# Patient Record
Sex: FEMALE | Race: WHITE | NOT HISPANIC OR LATINO | Employment: FULL TIME | URBAN - METROPOLITAN AREA
[De-identification: names, ages, dates, MRNs, and addresses within clinical notes are randomized per-mention and may not be internally consistent; named-entity substitution may affect disease eponyms.]

---

## 2017-09-21 ENCOUNTER — TRANSCRIBE ORDERS (OUTPATIENT)
Dept: LAB | Facility: HOSPITAL | Age: 22
End: 2017-09-21

## 2017-09-21 ENCOUNTER — LAB (OUTPATIENT)
Dept: LAB | Facility: HOSPITAL | Age: 22
End: 2017-09-21

## 2017-09-21 DIAGNOSIS — Z34.81 PRENATAL CARE, SUBSEQUENT PREGNANCY, FIRST TRIMESTER: Primary | ICD-10-CM

## 2017-09-21 DIAGNOSIS — Z34.81 PRENATAL CARE, SUBSEQUENT PREGNANCY, FIRST TRIMESTER: ICD-10-CM

## 2017-09-21 LAB
ABO GROUP BLD: NORMAL
AMPHET+METHAMPHET UR QL: NEGATIVE
AMPHETAMINES UR QL: NEGATIVE
BACTERIA UR QL AUTO: NORMAL /HPF
BARBITURATES UR QL SCN: NEGATIVE
BASOPHILS # BLD AUTO: 0.03 10*3/MM3 (ref 0–0.2)
BASOPHILS NFR BLD AUTO: 0.3 % (ref 0–1)
BENZODIAZ UR QL SCN: NEGATIVE
BILIRUB UR QL STRIP: NEGATIVE
BILIRUB UR QL STRIP: NEGATIVE
BLD GP AB SCN SERPL QL: NEGATIVE
BUPRENORPHINE SERPL-MCNC: NEGATIVE NG/ML
CANNABINOIDS SERPL QL: NEGATIVE
CLARITY UR: CLEAR
CLARITY UR: CLEAR
COCAINE UR QL: NEGATIVE
COLOR UR: YELLOW
COLOR UR: YELLOW
DEPRECATED RDW RBC AUTO: 40.6 FL (ref 37–54)
EOSINOPHIL # BLD AUTO: 0.18 10*3/MM3 (ref 0–0.3)
EOSINOPHIL NFR BLD AUTO: 1.9 % (ref 0–3)
ERYTHROCYTE [DISTWIDTH] IN BLOOD BY AUTOMATED COUNT: 13.4 % (ref 11.3–14.5)
EXTERNAL ANTIBODY SCREEN: NEGATIVE
EXTERNAL HEPATITIS B SURFACE ANTIGEN: NEGATIVE
EXTERNAL HEPATITIS C AB: NEGATIVE
EXTERNAL RUBELLA QUALITATIVE: NORMAL
EXTERNAL SYPHILIS RPR SCREEN: NORMAL
EXTERNAL URINE DRUG SCREEN: NEGATIVE
GLUCOSE BLD-MCNC: 81 MG/DL (ref 70–100)
GLUCOSE UR STRIP-MCNC: NEGATIVE MG/DL
GLUCOSE UR STRIP-MCNC: NEGATIVE MG/DL
HBV SURFACE AG SERPL QL IA: NORMAL
HCT VFR BLD AUTO: 38.3 % (ref 34.5–44)
HCV AB SER DONR QL: NORMAL
HGB BLD-MCNC: 12.6 G/DL (ref 11.5–15.5)
HGB UR QL STRIP.AUTO: NEGATIVE
HGB UR QL STRIP.AUTO: NEGATIVE
HIV1 P24 AG SERPL QL IA: NORMAL
HIV1+2 AB SER QL: NORMAL
HYALINE CASTS UR QL AUTO: NORMAL /LPF
IMM GRANULOCYTES # BLD: 0.03 10*3/MM3 (ref 0–0.03)
IMM GRANULOCYTES NFR BLD: 0.3 % (ref 0–0.6)
KETONES UR QL STRIP: ABNORMAL
KETONES UR QL STRIP: ABNORMAL
LEUKOCYTE ESTERASE UR QL STRIP.AUTO: NEGATIVE
LEUKOCYTE ESTERASE UR QL STRIP.AUTO: NEGATIVE
LYMPHOCYTES # BLD AUTO: 1.76 10*3/MM3 (ref 0.6–4.8)
LYMPHOCYTES NFR BLD AUTO: 18.2 % (ref 24–44)
MCH RBC QN AUTO: 27.3 PG (ref 27–31)
MCHC RBC AUTO-ENTMCNC: 32.9 G/DL (ref 32–36)
MCV RBC AUTO: 82.9 FL (ref 80–99)
METHADONE UR QL SCN: NEGATIVE
MONOCYTES # BLD AUTO: 0.54 10*3/MM3 (ref 0–1)
MONOCYTES NFR BLD AUTO: 5.6 % (ref 0–12)
NEUTROPHILS # BLD AUTO: 7.15 10*3/MM3 (ref 1.5–8.3)
NEUTROPHILS NFR BLD AUTO: 73.7 % (ref 41–71)
NITRITE UR QL STRIP: NEGATIVE
NITRITE UR QL STRIP: NEGATIVE
OPIATES UR QL: NEGATIVE
OXYCODONE UR QL SCN: NEGATIVE
PCP UR QL SCN: NEGATIVE
PH UR STRIP.AUTO: 7 [PH] (ref 5–8)
PH UR STRIP.AUTO: 7 [PH] (ref 5–8)
PLATELET # BLD AUTO: 290 10*3/MM3 (ref 150–450)
PMV BLD AUTO: 10.1 FL (ref 6–12)
PROPOXYPH UR QL: NEGATIVE
PROT UR QL STRIP: NEGATIVE
PROT UR QL STRIP: NEGATIVE
RBC # BLD AUTO: 4.62 10*6/MM3 (ref 3.89–5.14)
RBC # UR: NORMAL /HPF
REF LAB TEST METHOD: NORMAL
RH BLD: POSITIVE
RUBV IGG SER QL: ABNORMAL
RUBV IGG SER-ACNC: 9.7 IU/ML
SP GR UR STRIP: 1.01 (ref 1–1.03)
SP GR UR STRIP: 1.01 (ref 1–1.03)
SQUAMOUS #/AREA URNS HPF: NORMAL /HPF
TRICYCLICS UR QL SCN: NEGATIVE
TSH SERPL DL<=0.05 MIU/L-ACNC: 3.23 MIU/ML (ref 0.35–5.35)
UROBILINOGEN UR QL STRIP: ABNORMAL
UROBILINOGEN UR QL STRIP: ABNORMAL
WBC NRBC COR # BLD: 9.69 10*3/MM3 (ref 3.5–10.8)
WBC UR QL AUTO: NORMAL /HPF

## 2017-09-21 PROCEDURE — 82947 ASSAY GLUCOSE BLOOD QUANT: CPT | Performed by: OBSTETRICS & GYNECOLOGY

## 2017-09-21 PROCEDURE — 81003 URINALYSIS AUTO W/O SCOPE: CPT | Performed by: OBSTETRICS & GYNECOLOGY

## 2017-09-21 PROCEDURE — 80081 OBSTETRIC PANEL INC HIV TSTG: CPT | Performed by: OBSTETRICS & GYNECOLOGY

## 2017-09-21 PROCEDURE — 84443 ASSAY THYROID STIM HORMONE: CPT | Performed by: OBSTETRICS & GYNECOLOGY

## 2017-09-21 PROCEDURE — 81001 URINALYSIS AUTO W/SCOPE: CPT | Performed by: OBSTETRICS & GYNECOLOGY

## 2017-09-21 PROCEDURE — 85025 COMPLETE CBC W/AUTO DIFF WBC: CPT | Performed by: OBSTETRICS & GYNECOLOGY

## 2017-09-21 PROCEDURE — 36415 COLL VENOUS BLD VENIPUNCTURE: CPT

## 2017-09-21 PROCEDURE — 80306 DRUG TEST PRSMV INSTRMNT: CPT | Performed by: OBSTETRICS & GYNECOLOGY

## 2017-09-21 PROCEDURE — 86803 HEPATITIS C AB TEST: CPT | Performed by: OBSTETRICS & GYNECOLOGY

## 2017-09-22 LAB — RPR SER QL: NORMAL

## 2018-02-08 ENCOUNTER — LAB (OUTPATIENT)
Dept: LAB | Facility: HOSPITAL | Age: 23
End: 2018-02-08

## 2018-02-08 ENCOUNTER — TRANSCRIBE ORDERS (OUTPATIENT)
Dept: LAB | Facility: HOSPITAL | Age: 23
End: 2018-02-08

## 2018-02-08 DIAGNOSIS — Z3A.28 28 WEEKS GESTATION OF PREGNANCY: Primary | ICD-10-CM

## 2018-02-08 DIAGNOSIS — Z34.83 PRENATAL CARE, SUBSEQUENT PREGNANCY, THIRD TRIMESTER: ICD-10-CM

## 2018-02-08 DIAGNOSIS — Z3A.28 28 WEEKS GESTATION OF PREGNANCY: ICD-10-CM

## 2018-02-08 LAB
BLD GP AB SCN SERPL QL: NEGATIVE
DEPRECATED RDW RBC AUTO: 39.5 FL (ref 37–54)
ERYTHROCYTE [DISTWIDTH] IN BLOOD BY AUTOMATED COUNT: 12.7 % (ref 11.3–14.5)
EXTERNAL GLUCOSE TOLERANCE TEST FASTING: 112 MG/DL
EXTERNAL GTT 1 HOUR: NORMAL
GLUCOSE 1H P 100 G GLC PO SERPL-MCNC: 96 MG/DL (ref 65–199)
GLUCOSE BLDC GLUCOMTR-MCNC: 112 MG/DL
HCT VFR BLD AUTO: 33.5 % (ref 34.5–44)
HGB BLD-MCNC: 10.7 G/DL (ref 11.5–15.5)
MCH RBC QN AUTO: 27.3 PG (ref 27–31)
MCHC RBC AUTO-ENTMCNC: 31.9 G/DL (ref 32–36)
MCV RBC AUTO: 85.5 FL (ref 80–99)
PLATELET # BLD AUTO: 259 10*3/MM3 (ref 150–450)
PMV BLD AUTO: 10.1 FL (ref 6–12)
RBC # BLD AUTO: 3.92 10*6/MM3 (ref 3.89–5.14)
TSH SERPL DL<=0.05 MIU/L-ACNC: 1.81 MIU/ML (ref 0.35–5.35)
WBC NRBC COR # BLD: 9.78 10*3/MM3 (ref 3.5–10.8)

## 2018-02-08 PROCEDURE — 82962 GLUCOSE BLOOD TEST: CPT | Performed by: OBSTETRICS & GYNECOLOGY

## 2018-02-08 PROCEDURE — 82950 GLUCOSE TEST: CPT

## 2018-02-08 PROCEDURE — 84443 ASSAY THYROID STIM HORMONE: CPT

## 2018-02-08 PROCEDURE — 85027 COMPLETE CBC AUTOMATED: CPT

## 2018-02-08 PROCEDURE — 86850 RBC ANTIBODY SCREEN: CPT

## 2018-02-08 PROCEDURE — 36415 COLL VENOUS BLD VENIPUNCTURE: CPT

## 2018-04-09 LAB — EXTERNAL GROUP B STREP ANTIGEN: NEGATIVE

## 2018-04-30 ENCOUNTER — HOSPITAL ENCOUNTER (INPATIENT)
Facility: HOSPITAL | Age: 23
LOS: 3 days | Discharge: HOME | End: 2018-05-03
Attending: OBSTETRICS & GYNECOLOGY | Admitting: OBSTETRICS & GYNECOLOGY

## 2018-04-30 ENCOUNTER — ANESTHESIA (OUTPATIENT)
Dept: LABOR AND DELIVERY | Facility: HOSPITAL | Age: 23
End: 2018-04-30

## 2018-04-30 ENCOUNTER — ANESTHESIA EVENT (OUTPATIENT)
Dept: LABOR AND DELIVERY | Facility: HOSPITAL | Age: 23
End: 2018-04-30

## 2018-04-30 VITALS — HEART RATE: 91 BPM | OXYGEN SATURATION: 100 % | SYSTOLIC BLOOD PRESSURE: 113 MMHG | DIASTOLIC BLOOD PRESSURE: 62 MMHG

## 2018-04-30 DIAGNOSIS — Z37.9 NORMAL LABOR: Primary | ICD-10-CM

## 2018-04-30 DIAGNOSIS — Z3A.39 PREGNANCY WITH 39 COMPLETED WEEKS GESTATION: ICD-10-CM

## 2018-04-30 LAB
ABO GROUP BLD: NORMAL
ATMOSPHERIC PRESS: ABNORMAL MMHG
ATMOSPHERIC PRESS: ABNORMAL MMHG
BASE EXCESS BLDCOA CALC-SCNC: -0.3 MMOL/L (ref 0–2)
BASE EXCESS BLDCOV CALC-SCNC: -2.3 MMOL/L (ref 0–2)
BDY SITE: ABNORMAL
BLD GP AB SCN SERPL QL: NEGATIVE
CO2 BLDA-SCNC: 22.6 MMOL/L (ref 22–33)
CO2 BLDA-SCNC: 26.9 MMOL/L (ref 22–33)
DEPRECATED RDW RBC AUTO: 38.5 FL (ref 37–54)
ERYTHROCYTE [DISTWIDTH] IN BLOOD BY AUTOMATED COUNT: 14.1 % (ref 11.3–14.5)
HCO3 BLDCOA-SCNC: 25.5 MMOL/L (ref 16.9–20.5)
HCO3 BLDCOV-SCNC: 21.5 MMOL/L (ref 18.6–21.4)
HCT VFR BLD AUTO: 34.3 % (ref 34.5–44)
HGB BLD-MCNC: 10.8 G/DL (ref 11.5–15.5)
HGB BLDA-MCNC: 19.6 G/DL (ref 14–18)
HGB BLDA-MCNC: 19.8 G/DL (ref 14–18)
HOROWITZ INDEX BLD+IHG-RTO: 21 %
HOROWITZ INDEX BLD+IHG-RTO: 21 %
MCH RBC QN AUTO: 23.5 PG (ref 27–31)
MCHC RBC AUTO-ENTMCNC: 31.5 G/DL (ref 32–36)
MCV RBC AUTO: 74.7 FL (ref 80–99)
MODALITY: ABNORMAL
MODALITY: ABNORMAL
PCO2 BLDCOA: 44.3 MMHG (ref 43.3–54.9)
PCO2 BLDCOV: 34.7 MM HG (ref 30–60)
PH BLDCOA: 7.37 PH UNITS (ref 7.2–7.3)
PH BLDCOV: 7.4 PH UNITS (ref 7.19–7.46)
PLATELET # BLD AUTO: 313 10*3/MM3 (ref 150–450)
PMV BLD AUTO: 9.7 FL (ref 6–12)
PO2 BLDCOA: 15 MMHG (ref 11.5–43.3)
PO2 BLDCOV: 28 MM HG
RBC # BLD AUTO: 4.59 10*6/MM3 (ref 3.89–5.14)
RH BLD: POSITIVE
SAO2 % BLDCOA: 27.2 %
SAO2 % BLDCOA: ABNORMAL % (ref 45–75)
SAO2 % BLDCOV: 65.7 %
T&S EXPIRATION DATE: NORMAL
WBC NRBC COR # BLD: 12.47 10*3/MM3 (ref 3.5–10.8)

## 2018-04-30 PROCEDURE — 25010000002 ROPIVACAINE PER 1 MG: Performed by: NURSE ANESTHETIST, CERTIFIED REGISTERED

## 2018-04-30 PROCEDURE — 86900 BLOOD TYPING SEROLOGIC ABO: CPT | Performed by: OBSTETRICS & GYNECOLOGY

## 2018-04-30 PROCEDURE — 25010000002 FENTANYL CITRATE (PF) 100 MCG/2ML SOLUTION: Performed by: NURSE ANESTHETIST, CERTIFIED REGISTERED

## 2018-04-30 PROCEDURE — 25010000003 CEFAZOLIN IN DEXTROSE 2-4 GM/100ML-% SOLUTION: Performed by: OBSTETRICS & GYNECOLOGY

## 2018-04-30 PROCEDURE — 86850 RBC ANTIBODY SCREEN: CPT | Performed by: OBSTETRICS & GYNECOLOGY

## 2018-04-30 PROCEDURE — 85027 COMPLETE CBC AUTOMATED: CPT | Performed by: OBSTETRICS & GYNECOLOGY

## 2018-04-30 PROCEDURE — 82805 BLOOD GASES W/O2 SATURATION: CPT | Performed by: OBSTETRICS & GYNECOLOGY

## 2018-04-30 PROCEDURE — 86901 BLOOD TYPING SEROLOGIC RH(D): CPT | Performed by: OBSTETRICS & GYNECOLOGY

## 2018-04-30 PROCEDURE — 25010000002 ONDANSETRON PER 1 MG: Performed by: NURSE ANESTHETIST, CERTIFIED REGISTERED

## 2018-04-30 PROCEDURE — 25010000003 MORPHINE PER 10 MG: Performed by: NURSE ANESTHETIST, CERTIFIED REGISTERED

## 2018-04-30 PROCEDURE — C1755 CATHETER, INTRASPINAL: HCPCS | Performed by: ANESTHESIOLOGY

## 2018-04-30 PROCEDURE — C1755 CATHETER, INTRASPINAL: HCPCS

## 2018-04-30 PROCEDURE — 59025 FETAL NON-STRESS TEST: CPT

## 2018-04-30 PROCEDURE — 25010000002 ONDANSETRON PER 1 MG: Performed by: OBSTETRICS & GYNECOLOGY

## 2018-04-30 RX ORDER — BUTORPHANOL TARTRATE 1 MG/ML
2 INJECTION, SOLUTION INTRAMUSCULAR; INTRAVENOUS
Status: DISCONTINUED | OUTPATIENT
Start: 2018-04-30 | End: 2018-04-30 | Stop reason: HOSPADM

## 2018-04-30 RX ORDER — IBUPROFEN 600 MG/1
600 TABLET ORAL ONCE AS NEEDED
Status: COMPLETED | OUTPATIENT
Start: 2018-04-30 | End: 2018-04-30

## 2018-04-30 RX ORDER — SODIUM CHLORIDE, SODIUM LACTATE, POTASSIUM CHLORIDE, CALCIUM CHLORIDE 600; 310; 30; 20 MG/100ML; MG/100ML; MG/100ML; MG/100ML
125 INJECTION, SOLUTION INTRAVENOUS CONTINUOUS
Status: DISCONTINUED | OUTPATIENT
Start: 2018-04-30 | End: 2018-05-03 | Stop reason: HOSPADM

## 2018-04-30 RX ORDER — DOCUSATE SODIUM 100 MG/1
100 CAPSULE, LIQUID FILLED ORAL 2 TIMES DAILY
Status: DISCONTINUED | OUTPATIENT
Start: 2018-05-01 | End: 2018-05-03 | Stop reason: HOSPADM

## 2018-04-30 RX ORDER — EPHEDRINE SULFATE/0.9% NACL/PF 50 MG/10ML
10 SYRINGE (ML) INTRAVENOUS
Status: DISCONTINUED | OUTPATIENT
Start: 2018-04-30 | End: 2018-04-30 | Stop reason: HOSPADM

## 2018-04-30 RX ORDER — NALOXONE HCL 0.4 MG/ML
0.4 VIAL (ML) INJECTION
Status: DISCONTINUED | OUTPATIENT
Start: 2018-04-30 | End: 2018-05-03 | Stop reason: HOSPADM

## 2018-04-30 RX ORDER — SIMETHICONE 80 MG
80 TABLET,CHEWABLE ORAL 4 TIMES DAILY
Status: DISCONTINUED | OUTPATIENT
Start: 2018-05-01 | End: 2018-05-03 | Stop reason: HOSPADM

## 2018-04-30 RX ORDER — TRISODIUM CITRATE DIHYDRATE AND CITRIC ACID MONOHYDRATE 500; 334 MG/5ML; MG/5ML
30 SOLUTION ORAL ONCE
Status: CANCELLED | OUTPATIENT
Start: 2018-04-30 | End: 2018-04-30

## 2018-04-30 RX ORDER — ONDANSETRON 2 MG/ML
4 INJECTION INTRAMUSCULAR; INTRAVENOUS ONCE AS NEEDED
Status: COMPLETED | OUTPATIENT
Start: 2018-04-30 | End: 2018-04-30

## 2018-04-30 RX ORDER — MAGNESIUM CARB/ALUMINUM HYDROX 105-160MG
30 TABLET,CHEWABLE ORAL ONCE
Status: DISCONTINUED | OUTPATIENT
Start: 2018-04-30 | End: 2018-04-30 | Stop reason: HOSPADM

## 2018-04-30 RX ORDER — ACETAMINOPHEN 325 MG/1
650 TABLET ORAL ONCE AS NEEDED
Status: DISCONTINUED | OUTPATIENT
Start: 2018-04-30 | End: 2018-04-30 | Stop reason: HOSPADM

## 2018-04-30 RX ORDER — PRENATAL VIT/IRON FUM/FOLIC AC 27MG-0.8MG
1 TABLET ORAL DAILY
Status: DISCONTINUED | OUTPATIENT
Start: 2018-05-01 | End: 2018-05-03 | Stop reason: HOSPADM

## 2018-04-30 RX ORDER — METHYLERGONOVINE MALEATE 0.2 MG/ML
200 INJECTION INTRAVENOUS ONCE AS NEEDED
Status: CANCELLED | OUTPATIENT
Start: 2018-04-30

## 2018-04-30 RX ORDER — ONDANSETRON 2 MG/ML
4 INJECTION INTRAMUSCULAR; INTRAVENOUS EVERY 6 HOURS PRN
Status: DISCONTINUED | OUTPATIENT
Start: 2018-04-30 | End: 2018-04-30 | Stop reason: HOSPADM

## 2018-04-30 RX ORDER — OXYCODONE AND ACETAMINOPHEN 7.5; 325 MG/1; MG/1
1 TABLET ORAL ONCE AS NEEDED
Status: COMPLETED | OUTPATIENT
Start: 2018-04-30 | End: 2018-04-30

## 2018-04-30 RX ORDER — LIDOCAINE HYDROCHLORIDE AND EPINEPHRINE 20; 5 MG/ML; UG/ML
INJECTION, SOLUTION EPIDURAL; INFILTRATION; INTRACAUDAL; PERINEURAL AS NEEDED
Status: DISCONTINUED | OUTPATIENT
Start: 2018-04-30 | End: 2018-04-30 | Stop reason: SURG

## 2018-04-30 RX ORDER — SIMETHICONE 80 MG
80 TABLET,CHEWABLE ORAL 4 TIMES DAILY PRN
Status: DISCONTINUED | OUTPATIENT
Start: 2018-04-30 | End: 2018-05-03 | Stop reason: HOSPADM

## 2018-04-30 RX ORDER — HYDROMORPHONE HYDROCHLORIDE 1 MG/ML
0.5 INJECTION, SOLUTION INTRAMUSCULAR; INTRAVENOUS; SUBCUTANEOUS
Status: DISCONTINUED | OUTPATIENT
Start: 2018-04-30 | End: 2018-04-30 | Stop reason: HOSPADM

## 2018-04-30 RX ORDER — ALUMINA, MAGNESIA, AND SIMETHICONE 2400; 2400; 240 MG/30ML; MG/30ML; MG/30ML
15 SUSPENSION ORAL EVERY 4 HOURS PRN
Status: DISCONTINUED | OUTPATIENT
Start: 2018-04-30 | End: 2018-05-03 | Stop reason: HOSPADM

## 2018-04-30 RX ORDER — OXYTOCIN/RINGER'S LACTATE 20/1000 ML
999 PLASTIC BAG, INJECTION (ML) INTRAVENOUS ONCE
Status: CANCELLED | OUTPATIENT
Start: 2018-04-30 | End: 2018-04-30

## 2018-04-30 RX ORDER — OXYTOCIN-SODIUM CHLORIDE 0.9% IV SOLN 30 UNIT/500ML 30-0.9/5 UT/ML-%
2 SOLUTION INTRAVENOUS
Status: DISCONTINUED | OUTPATIENT
Start: 2018-04-30 | End: 2018-05-03 | Stop reason: HOSPADM

## 2018-04-30 RX ORDER — ONDANSETRON 4 MG/1
4 TABLET, FILM COATED ORAL EVERY 6 HOURS PRN
Status: DISCONTINUED | OUTPATIENT
Start: 2018-04-30 | End: 2018-04-30 | Stop reason: HOSPADM

## 2018-04-30 RX ORDER — SODIUM CHLORIDE 0.9 % (FLUSH) 0.9 %
1-10 SYRINGE (ML) INJECTION AS NEEDED
Status: DISCONTINUED | OUTPATIENT
Start: 2018-04-30 | End: 2018-04-30 | Stop reason: HOSPADM

## 2018-04-30 RX ORDER — MORPHINE SULFATE 2 MG/ML
2 INJECTION, SOLUTION INTRAMUSCULAR; INTRAVENOUS EVERY 4 HOURS PRN
Status: DISCONTINUED | OUTPATIENT
Start: 2018-04-30 | End: 2018-05-03 | Stop reason: HOSPADM

## 2018-04-30 RX ORDER — DIPHENHYDRAMINE HYDROCHLORIDE 50 MG/ML
12.5 INJECTION INTRAMUSCULAR; INTRAVENOUS EVERY 8 HOURS PRN
Status: DISCONTINUED | OUTPATIENT
Start: 2018-04-30 | End: 2018-04-30 | Stop reason: HOSPADM

## 2018-04-30 RX ORDER — LIDOCAINE HYDROCHLORIDE 10 MG/ML
5 INJECTION, SOLUTION EPIDURAL; INFILTRATION; INTRACAUDAL; PERINEURAL AS NEEDED
Status: DISCONTINUED | OUTPATIENT
Start: 2018-04-30 | End: 2018-04-30 | Stop reason: HOSPADM

## 2018-04-30 RX ORDER — CALCIUM CARBONATE 200(500)MG
2 TABLET,CHEWABLE ORAL 3 TIMES DAILY PRN
Status: DISCONTINUED | OUTPATIENT
Start: 2018-04-30 | End: 2018-05-03 | Stop reason: HOSPADM

## 2018-04-30 RX ORDER — CARBOPROST TROMETHAMINE 250 UG/ML
250 INJECTION, SOLUTION INTRAMUSCULAR AS NEEDED
Status: CANCELLED | OUTPATIENT
Start: 2018-04-30

## 2018-04-30 RX ORDER — IBUPROFEN 600 MG/1
600 TABLET ORAL EVERY 6 HOURS PRN
Status: DISCONTINUED | OUTPATIENT
Start: 2018-04-30 | End: 2018-05-03 | Stop reason: HOSPADM

## 2018-04-30 RX ORDER — ACETAMINOPHEN 325 MG/1
650 TABLET ORAL EVERY 4 HOURS PRN
Status: DISCONTINUED | OUTPATIENT
Start: 2018-04-30 | End: 2018-04-30 | Stop reason: HOSPADM

## 2018-04-30 RX ORDER — ROPIVACAINE HYDROCHLORIDE 2 MG/ML
15 INJECTION, SOLUTION EPIDURAL; INFILTRATION; PERINEURAL CONTINUOUS
Status: DISCONTINUED | OUTPATIENT
Start: 2018-04-30 | End: 2018-05-03 | Stop reason: HOSPADM

## 2018-04-30 RX ORDER — LIDOCAINE HYDROCHLORIDE AND EPINEPHRINE 15; 5 MG/ML; UG/ML
INJECTION, SOLUTION EPIDURAL AS NEEDED
Status: DISCONTINUED | OUTPATIENT
Start: 2018-04-30 | End: 2018-04-30 | Stop reason: SURG

## 2018-04-30 RX ORDER — BUTORPHANOL TARTRATE 1 MG/ML
1 INJECTION, SOLUTION INTRAMUSCULAR; INTRAVENOUS
Status: DISCONTINUED | OUTPATIENT
Start: 2018-04-30 | End: 2018-04-30 | Stop reason: HOSPADM

## 2018-04-30 RX ORDER — NALOXONE HCL 0.4 MG/ML
0.4 VIAL (ML) INJECTION
Status: CANCELLED | OUTPATIENT
Start: 2018-04-30 | End: 2018-05-01

## 2018-04-30 RX ORDER — MISOPROSTOL 200 UG/1
800 TABLET ORAL AS NEEDED
Status: CANCELLED | OUTPATIENT
Start: 2018-04-30

## 2018-04-30 RX ORDER — FENTANYL CITRATE 50 UG/ML
INJECTION, SOLUTION INTRAMUSCULAR; INTRAVENOUS AS NEEDED
Status: DISCONTINUED | OUTPATIENT
Start: 2018-04-30 | End: 2018-04-30 | Stop reason: SURG

## 2018-04-30 RX ORDER — OXYTOCIN 10 [USP'U]/ML
INJECTION, SOLUTION INTRAMUSCULAR; INTRAVENOUS AS NEEDED
Status: DISCONTINUED | OUTPATIENT
Start: 2018-04-30 | End: 2018-04-30 | Stop reason: SURG

## 2018-04-30 RX ORDER — METOCLOPRAMIDE HYDROCHLORIDE 5 MG/ML
10 INJECTION INTRAMUSCULAR; INTRAVENOUS ONCE AS NEEDED
Status: DISCONTINUED | OUTPATIENT
Start: 2018-04-30 | End: 2018-04-30 | Stop reason: HOSPADM

## 2018-04-30 RX ORDER — ONDANSETRON 2 MG/ML
4 INJECTION INTRAMUSCULAR; INTRAVENOUS ONCE
Status: DISCONTINUED | OUTPATIENT
Start: 2018-04-30 | End: 2018-04-30 | Stop reason: HOSPADM

## 2018-04-30 RX ORDER — LANOLIN 100 %
OINTMENT (GRAM) TOPICAL
Status: DISCONTINUED | OUTPATIENT
Start: 2018-04-30 | End: 2018-05-03 | Stop reason: HOSPADM

## 2018-04-30 RX ORDER — OXYTOCIN/RINGER'S LACTATE 20/1000 ML
125 PLASTIC BAG, INJECTION (ML) INTRAVENOUS CONTINUOUS PRN
Status: CANCELLED | OUTPATIENT
Start: 2018-04-30

## 2018-04-30 RX ORDER — ONDANSETRON 4 MG/1
4 TABLET, FILM COATED ORAL EVERY 6 HOURS PRN
Status: DISCONTINUED | OUTPATIENT
Start: 2018-04-30 | End: 2018-05-03 | Stop reason: HOSPADM

## 2018-04-30 RX ORDER — DIPHENHYDRAMINE HCL 25 MG
25 CAPSULE ORAL EVERY 4 HOURS PRN
Status: DISCONTINUED | OUTPATIENT
Start: 2018-04-30 | End: 2018-05-03 | Stop reason: HOSPADM

## 2018-04-30 RX ORDER — PRENATAL WITH FERROUS FUM AND FOLIC ACID 3080; 920; 120; 400; 22; 1.84; 3; 20; 10; 1; 12; 200; 27; 25; 2 [IU]/1; [IU]/1; MG/1; [IU]/1; MG/1; MG/1; MG/1; MG/1; MG/1; MG/1; UG/1; MG/1; MG/1; MG/1; MG/1
1 TABLET ORAL DAILY
COMMUNITY

## 2018-04-30 RX ORDER — MORPHINE SULFATE 0.5 MG/ML
INJECTION, SOLUTION EPIDURAL; INTRATHECAL; INTRAVENOUS AS NEEDED
Status: DISCONTINUED | OUTPATIENT
Start: 2018-04-30 | End: 2018-04-30 | Stop reason: SURG

## 2018-04-30 RX ORDER — ONDANSETRON 2 MG/ML
4 INJECTION INTRAMUSCULAR; INTRAVENOUS EVERY 6 HOURS PRN
Status: DISCONTINUED | OUTPATIENT
Start: 2018-04-30 | End: 2018-05-03 | Stop reason: HOSPADM

## 2018-04-30 RX ORDER — CEFAZOLIN SODIUM 2 G/100ML
2 INJECTION, SOLUTION INTRAVENOUS ONCE
Status: COMPLETED | OUTPATIENT
Start: 2018-04-30 | End: 2018-04-30

## 2018-04-30 RX ORDER — OXYCODONE HYDROCHLORIDE AND ACETAMINOPHEN 5; 325 MG/1; MG/1
2 TABLET ORAL EVERY 4 HOURS PRN
Status: DISCONTINUED | OUTPATIENT
Start: 2018-04-30 | End: 2018-05-03 | Stop reason: HOSPADM

## 2018-04-30 RX ORDER — TRISODIUM CITRATE DIHYDRATE AND CITRIC ACID MONOHYDRATE 500; 334 MG/5ML; MG/5ML
30 SOLUTION ORAL ONCE
Status: COMPLETED | OUTPATIENT
Start: 2018-04-30 | End: 2018-04-30

## 2018-04-30 RX ADMIN — SODIUM CHLORIDE, POTASSIUM CHLORIDE, SODIUM LACTATE AND CALCIUM CHLORIDE: 600; 310; 30; 20 INJECTION, SOLUTION INTRAVENOUS at 16:30

## 2018-04-30 RX ADMIN — LIDOCAINE HYDROCHLORIDE AND EPINEPHRINE 2 ML: 15; 5 INJECTION, SOLUTION EPIDURAL at 12:09

## 2018-04-30 RX ADMIN — SODIUM CHLORIDE, POTASSIUM CHLORIDE, SODIUM LACTATE AND CALCIUM CHLORIDE 125 ML/HR: 600; 310; 30; 20 INJECTION, SOLUTION INTRAVENOUS at 03:52

## 2018-04-30 RX ADMIN — IBUPROFEN 600 MG: 600 TABLET ORAL at 17:38

## 2018-04-30 RX ADMIN — FENTANYL CITRATE 50 MCG: 50 INJECTION, SOLUTION INTRAMUSCULAR; INTRAVENOUS at 16:42

## 2018-04-30 RX ADMIN — CEFAZOLIN SODIUM 2 G: 2 INJECTION, SOLUTION INTRAVENOUS at 16:01

## 2018-04-30 RX ADMIN — LIDOCAINE HYDROCHLORIDE AND EPINEPHRINE 3 ML: 15; 5 INJECTION, SOLUTION EPIDURAL at 12:06

## 2018-04-30 RX ADMIN — SODIUM CHLORIDE, POTASSIUM CHLORIDE, SODIUM LACTATE AND CALCIUM CHLORIDE 1000 ML: 600; 310; 30; 20 INJECTION, SOLUTION INTRAVENOUS at 11:25

## 2018-04-30 RX ADMIN — OXYTOCIN 2 MILLI-UNITS/MIN: 10 INJECTION INTRAVENOUS at 10:25

## 2018-04-30 RX ADMIN — LIDOCAINE HYDROCHLORIDE,EPINEPHRINE BITARTRATE 5 ML: 20; .005 INJECTION, SOLUTION EPIDURAL; INFILTRATION; INTRACAUDAL; PERINEURAL at 15:57

## 2018-04-30 RX ADMIN — SODIUM CHLORIDE, POTASSIUM CHLORIDE, SODIUM LACTATE AND CALCIUM CHLORIDE 1000 ML: 600; 310; 30; 20 INJECTION, SOLUTION INTRAVENOUS at 11:59

## 2018-04-30 RX ADMIN — ONDANSETRON 4 MG: 2 INJECTION INTRAMUSCULAR; INTRAVENOUS at 16:11

## 2018-04-30 RX ADMIN — ROPIVACAINE HYDROCHLORIDE 15 ML/HR: 2 INJECTION, SOLUTION EPIDURAL; INFILTRATION at 12:14

## 2018-04-30 RX ADMIN — ONDANSETRON 4 MG: 2 INJECTION INTRAMUSCULAR; INTRAVENOUS at 23:27

## 2018-04-30 RX ADMIN — ROPIVACAINE HYDROCHLORIDE 10 ML: 5 INJECTION, SOLUTION EPIDURAL; INFILTRATION; PERINEURAL at 12:13

## 2018-04-30 RX ADMIN — FENTANYL CITRATE 100 MCG: 50 INJECTION, SOLUTION INTRAMUSCULAR; INTRAVENOUS at 12:10

## 2018-04-30 RX ADMIN — OXYTOCIN 20 UNITS: 10 INJECTION, SOLUTION INTRAMUSCULAR; INTRAVENOUS at 16:30

## 2018-04-30 RX ADMIN — MORPHINE SULFATE 2 MG: 0.5 INJECTION, SOLUTION EPIDURAL; INTRATHECAL; INTRAVENOUS at 16:36

## 2018-04-30 RX ADMIN — MORPHINE SULFATE 1 MG: 0.5 INJECTION, SOLUTION EPIDURAL; INTRATHECAL; INTRAVENOUS at 16:46

## 2018-04-30 RX ADMIN — SODIUM CITRATE AND CITRIC ACID MONOHYDRATE 30 ML: 500; 334 SOLUTION ORAL at 16:01

## 2018-04-30 RX ADMIN — OXYTOCIN 20 UNITS: 10 INJECTION, SOLUTION INTRAMUSCULAR; INTRAVENOUS at 16:56

## 2018-04-30 RX ADMIN — SODIUM CHLORIDE, POTASSIUM CHLORIDE, SODIUM LACTATE AND CALCIUM CHLORIDE: 600; 310; 30; 20 INJECTION, SOLUTION INTRAVENOUS at 16:56

## 2018-04-30 RX ADMIN — OXYCODONE HYDROCHLORIDE AND ACETAMINOPHEN 1 TABLET: 7.5; 325 TABLET ORAL at 17:39

## 2018-04-30 RX ADMIN — SODIUM CHLORIDE, POTASSIUM CHLORIDE, SODIUM LACTATE AND CALCIUM CHLORIDE 125 ML/HR: 600; 310; 30; 20 INJECTION, SOLUTION INTRAVENOUS at 15:19

## 2018-05-01 LAB
BASOPHILS # BLD AUTO: 0.04 10*3/MM3 (ref 0–0.2)
BASOPHILS NFR BLD AUTO: 0.3 % (ref 0–1)
DEPRECATED RDW RBC AUTO: 40.1 FL (ref 37–54)
EOSINOPHIL # BLD AUTO: 0.03 10*3/MM3 (ref 0–0.3)
EOSINOPHIL NFR BLD AUTO: 0.2 % (ref 0–3)
ERYTHROCYTE [DISTWIDTH] IN BLOOD BY AUTOMATED COUNT: 14.5 % (ref 11.3–14.5)
HCT VFR BLD AUTO: 25.4 % (ref 34.5–44)
HGB BLD-MCNC: 7.9 G/DL (ref 11.5–15.5)
IMM GRANULOCYTES # BLD: 0.06 10*3/MM3 (ref 0–0.03)
IMM GRANULOCYTES NFR BLD: 0.4 % (ref 0–0.6)
LYMPHOCYTES # BLD AUTO: 2.39 10*3/MM3 (ref 0.6–4.8)
LYMPHOCYTES NFR BLD AUTO: 17.9 % (ref 24–44)
MCH RBC QN AUTO: 23.4 PG (ref 27–31)
MCHC RBC AUTO-ENTMCNC: 31.1 G/DL (ref 32–36)
MCV RBC AUTO: 75.4 FL (ref 80–99)
MONOCYTES # BLD AUTO: 0.94 10*3/MM3 (ref 0–1)
MONOCYTES NFR BLD AUTO: 7 % (ref 0–12)
NEUTROPHILS # BLD AUTO: 9.96 10*3/MM3 (ref 1.5–8.3)
NEUTROPHILS NFR BLD AUTO: 74.6 % (ref 41–71)
PLATELET # BLD AUTO: 259 10*3/MM3 (ref 150–450)
PMV BLD AUTO: 9.9 FL (ref 6–12)
RBC # BLD AUTO: 3.37 10*6/MM3 (ref 3.89–5.14)
WBC NRBC COR # BLD: 13.36 10*3/MM3 (ref 3.5–10.8)

## 2018-05-01 PROCEDURE — 85025 COMPLETE CBC W/AUTO DIFF WBC: CPT | Performed by: OBSTETRICS & GYNECOLOGY

## 2018-05-01 RX ORDER — FERROUS SULFATE 325(65) MG
325 TABLET ORAL 2 TIMES DAILY WITH MEALS
Status: DISCONTINUED | OUTPATIENT
Start: 2018-05-01 | End: 2018-05-03 | Stop reason: HOSPADM

## 2018-05-01 RX ORDER — AMMONIA INHALANTS 0.04 G/.3ML
INHALANT RESPIRATORY (INHALATION)
Status: DISPENSED
Start: 2018-05-01 | End: 2018-05-02

## 2018-05-01 RX ADMIN — SIMETHICONE CHEW TAB 80 MG 80 MG: 80 TABLET ORAL at 14:34

## 2018-05-01 RX ADMIN — SIMETHICONE CHEW TAB 80 MG 80 MG: 80 TABLET ORAL at 17:09

## 2018-05-01 RX ADMIN — DOCUSATE SODIUM 100 MG: 100 CAPSULE, LIQUID FILLED ORAL at 08:56

## 2018-05-01 RX ADMIN — IBUPROFEN 600 MG: 600 TABLET ORAL at 20:44

## 2018-05-01 RX ADMIN — SIMETHICONE CHEW TAB 80 MG 80 MG: 80 TABLET ORAL at 20:44

## 2018-05-01 RX ADMIN — FERROUS SULFATE TAB 325 MG (65 MG ELEMENTAL FE) 325 MG: 325 (65 FE) TAB at 17:09

## 2018-05-01 RX ADMIN — DOCUSATE SODIUM 100 MG: 100 CAPSULE, LIQUID FILLED ORAL at 20:43

## 2018-05-01 RX ADMIN — PRENATAL VIT W/ FE FUMARATE-FA TAB 27-0.8 MG 1 TABLET: 27-0.8 TAB at 08:56

## 2018-05-01 RX ADMIN — IBUPROFEN 600 MG: 600 TABLET ORAL at 08:56

## 2018-05-01 RX ADMIN — SIMETHICONE CHEW TAB 80 MG 80 MG: 80 TABLET ORAL at 08:56

## 2018-05-01 RX ADMIN — FERROUS SULFATE TAB 325 MG (65 MG ELEMENTAL FE) 325 MG: 325 (65 FE) TAB at 08:56

## 2018-05-01 NOTE — LACTATION NOTE
05/01/18 1100   Maternal Information   Date of Referral 05/01/18   Person Making Referral other (see comments)  (courtesy)   Maternal Reason for Referral breastfeeding currently   Maternal Assessment   Breast Size Issue none   Breast Shape Bilateral:;round   Breast Density Bilateral:;soft   Nipples everted   Maternal Infant Feeding   Maternal Emotional State anxious;assist needed   Infant Positioning clutch/football   Signs of Milk Transfer infant jaw motion present   Pain with Feeding no   Comfort Measures Before/During Feeding infant position adjusted;latch adjusted;maternal position adjusted   Nipple Shape After Feeding, Left round   Latch Assistance yes   Equipment Type   Breast Pump Type double electric, personal;double electric, hospital grade   Breast Pump Flange Type hard   Breast Pump Flange Size 24 mm   Reproductive Interventions   Breastfeeding Assistance assisted with positioning;support offered;infant suck/swallow verified;infant latch-on verified;infant stimulated to wakeful state;feeding session observed;feeding on demand promoted;feeding cue recognition promoted   Breastfeeding Support encouragement provided;diary/feeding log utilized;lactation counseling provided   Breast Pumping   Breast Pumping Interventions post-feed pumping encouraged   Breast Pumping double electric breast pump utilized

## 2018-05-01 NOTE — LACTATION NOTE
This note was copied from a baby's chart.  Initiated mom pumping post feedings.       05/01/18 1100   Nutrition   Feeding Method breastfeeding   Feeding Tolerance/Success arousal required;coordinated suck;coordinated swallow   Satiety Cues cessation of sucking   Feeding Interventions latch assistance provided;arousal required   Breastfeeding Session   Breastfeeding breastfeeding, left side only   Infant Positioning clutch/football   Effective Latch During Feeding yes   Suck/Swallow Coordination present   Signs of Milk Transfer infant jaw motion present   LATCH Score   Latch 2-->grasps breast, tongue down, lips flanged, rhythmic sucking   Audible Swallowing 1-->a few with stimulation   Type of Nipple 2-->everted (after stimulation)   Comfort (Breast/Nipple) 2-->soft/nontender   Hold (Positioning) 1-->minimal assist, teach one side, mother does other, staff holds   Score 8

## 2018-05-01 NOTE — PROGRESS NOTES
2018    Name:Yadira Angela    MR#:[id number]     PROGRESS NOTE:  Post-Op 1 S/P        Subjective   22 y.o. yo Female  s/p CS at 39w3d doing well. Pain well controlled, lochia appropriate.    Patient Active Problem List   Diagnosis   • Delivered by  section        Objective    Vitals  Temp:  Temp:  [97.8 °F (36.6 °C)-99.2 °F (37.3 °C)] 98.1 °F (36.7 °C)  Temp src: Oral  BP:  BP: ()/() 105/54  Pulse:  Heart Rate:  [] 80  RR:   Resp:  [16-18] 16    General Awake, alert, no distress  Abdomen Soft, non-distended, fundus firm, below umbilicus, appropriately tender  Incision  Intact, no erythema or exudate  Extremities Calves NT bilaterally     I/O last 3 completed shifts:  In: 1400 [I.V.:1400]  Out: 2800 [Urine:2000; Blood:800]    LABS:   Lab Results   Component Value Date    WBC 13.36 (H) 2018    HGB 7.9 (L) 2018    HCT 25.4 (L) 2018    MCV 75.4 (L) 2018     2018       Infant: male       Assessment   1.  POD 1 from  Section     Plan: Doing well.    D/C IV, advance diet, may shower.  Will start FeSO4 BID for anemia; will repeat H/H in AM. Pt reports lochia less than menses.  Continue routine post-operative/postpartum care.          Akiko Miller MD  2018 8:32 AM

## 2018-05-01 NOTE — PLAN OF CARE
Problem: Patient Care Overview  Goal: Plan of Care Review  Outcome: Ongoing (interventions implemented as appropriate)      Problem: Breastfeeding (Adult,Obstetrics,Pediatric)  Goal: Signs and Symptoms of Listed Potential Problems Will be Absent, Minimized or Managed (Breastfeeding)  Outcome: Ongoing (interventions implemented as appropriate)

## 2018-05-02 LAB
HCT VFR BLD AUTO: 24.1 % (ref 34.5–44)
HGB BLD-MCNC: 7.5 G/DL (ref 11.5–15.5)

## 2018-05-02 PROCEDURE — 85014 HEMATOCRIT: CPT | Performed by: OBSTETRICS & GYNECOLOGY

## 2018-05-02 PROCEDURE — 85018 HEMOGLOBIN: CPT | Performed by: OBSTETRICS & GYNECOLOGY

## 2018-05-02 RX ADMIN — PRENATAL VIT W/ FE FUMARATE-FA TAB 27-0.8 MG 1 TABLET: 27-0.8 TAB at 08:40

## 2018-05-02 RX ADMIN — SIMETHICONE CHEW TAB 80 MG 80 MG: 80 TABLET ORAL at 08:40

## 2018-05-02 RX ADMIN — DOCUSATE SODIUM 100 MG: 100 CAPSULE, LIQUID FILLED ORAL at 21:04

## 2018-05-02 RX ADMIN — IBUPROFEN 600 MG: 600 TABLET ORAL at 21:04

## 2018-05-02 RX ADMIN — SIMETHICONE CHEW TAB 80 MG 80 MG: 80 TABLET ORAL at 21:05

## 2018-05-02 RX ADMIN — FERROUS SULFATE TAB 325 MG (65 MG ELEMENTAL FE) 325 MG: 325 (65 FE) TAB at 08:40

## 2018-05-02 RX ADMIN — IBUPROFEN 600 MG: 600 TABLET ORAL at 13:05

## 2018-05-02 RX ADMIN — DOCUSATE SODIUM 100 MG: 100 CAPSULE, LIQUID FILLED ORAL at 08:40

## 2018-05-02 RX ADMIN — IBUPROFEN 600 MG: 600 TABLET ORAL at 06:38

## 2018-05-02 NOTE — PROGRESS NOTES
2018    Name:Yadira Angela    MR#:[id number]     PROGRESS NOTE:  Post-Op 2 S/P        Subjective   22 y.o. yo Female  s/p CS at 39w3d doing well. Pain well controlled, lochia appropriate, tolerating diet.     Patient Active Problem List   Diagnosis   • Delivered by  section        Objective    Vitals  Temp:  Temp:  [97.6 °F (36.4 °C)-98.4 °F (36.9 °C)] 97.9 °F (36.6 °C)  Temp src: Oral  BP:  BP: ()/(54-71) 99/58  Pulse:  Heart Rate:  [] 91  RR:   Resp:  [16-18] 16    General Awake, alert, no distress  Abdomen Soft, non-distended, fundus firm, below umbilicus, appropriately tender  Incision  Intact, no erythema or exudate  Extremities Calves NT bilaterally     I/O last 3 completed shifts:  In: -   Out: 2950 [Urine:2950]    LABS:   Lab Results   Component Value Date    WBC 13.36 (H) 2018    HGB 7.5 (L) 2018    HCT 24.1 (L) 2018    MCV 75.4 (L) 2018     2018       Infant: male       Assessment   1.  POD 2 from  Section   2.  Anemia    Plan: Doing well.    Continue routine post-operative/postpartum care.  H/H stable this AM, no signs/sx of anemia. Will continue FeSO4 BID on discharge.  Plan d/c home tomorrow.          Akiko Miller MD  2018 8:31 AM

## 2018-05-03 VITALS
OXYGEN SATURATION: 100 % | HEART RATE: 82 BPM | BODY MASS INDEX: 38.88 KG/M2 | TEMPERATURE: 98.2 F | WEIGHT: 168 LBS | DIASTOLIC BLOOD PRESSURE: 72 MMHG | HEIGHT: 55 IN | RESPIRATION RATE: 16 BRPM | SYSTOLIC BLOOD PRESSURE: 116 MMHG

## 2018-05-03 RX ORDER — HYDROCODONE BITARTRATE AND ACETAMINOPHEN 5; 325 MG/1; MG/1
1 TABLET ORAL EVERY 6 HOURS PRN
Qty: 10 TABLET | Refills: 0 | Status: ON HOLD | OUTPATIENT
Start: 2018-05-03 | End: 2020-06-04

## 2018-05-03 RX ORDER — IBUPROFEN 600 MG/1
600 TABLET ORAL EVERY 6 HOURS PRN
Qty: 90 TABLET | Refills: 1 | Status: ON HOLD | OUTPATIENT
Start: 2018-05-03 | End: 2020-06-04

## 2018-05-03 RX ORDER — FERROUS SULFATE 325(65) MG
325 TABLET ORAL 2 TIMES DAILY WITH MEALS
Qty: 90 TABLET | Refills: 1 | Status: SHIPPED | OUTPATIENT
Start: 2018-05-03 | End: 2021-01-06

## 2018-05-03 RX ADMIN — DOCUSATE SODIUM 100 MG: 100 CAPSULE, LIQUID FILLED ORAL at 11:11

## 2018-05-03 RX ADMIN — IBUPROFEN 600 MG: 600 TABLET ORAL at 04:57

## 2018-05-03 RX ADMIN — FERROUS SULFATE TAB 325 MG (65 MG ELEMENTAL FE) 325 MG: 325 (65 FE) TAB at 11:10

## 2018-05-03 RX ADMIN — IBUPROFEN 600 MG: 600 TABLET ORAL at 11:10

## 2018-05-03 RX ADMIN — MEASLES, MUMPS, AND RUBELLA VIRUS VACCINE LIVE 0.5 ML: 1000; 12500; 1000 INJECTION, POWDER, LYOPHILIZED, FOR SUSPENSION SUBCUTANEOUS at 11:11

## 2018-05-03 RX ADMIN — PRENATAL VIT W/ FE FUMARATE-FA TAB 27-0.8 MG 1 TABLET: 27-0.8 TAB at 11:11

## 2018-05-03 NOTE — DISCHARGE SUMMARY
[facility]   Discharge Summary      Patient: Yadira Angela      MR#:[id number]  Admission  Diagnosis: <principal problem not specified>  Discharge Diagnosis:   1. Normal labor    2. Pregnancy with 39 completed weeks gestation        Date of Admission: 2018  Date of Discharge:  5/3/2018    Procedures:  , Low Transverse     2018    4:29 PM      Service:  Obstetrics    Hospital Course:  Patient underwent  section and remained in the hospital for 3 days.  During that time she remained afebrile and hemodynamically stable.  On the day of discharge, she was eating, ambulating and voiding without difficulty.        Labs:    Lab Results (last 24 hours)     ** No results found for the last 24 hours. **          Discharge Medications   Yadira Angela   Home Medication Instructions SARAH:[id number]    Printed on:18 4542   Medication Information                      ferrous sulfate 325 (65 FE) MG tablet  Take 1 tablet by mouth 2 (Two) Times a Day With Meals.             HYDROcodone-acetaminophen (NORCO) 5-325 MG per tablet  Take 1 tablet by mouth Every 6 (Six) Hours As Needed for Moderate Pain .             ibuprofen (ADVIL,MOTRIN) 600 MG tablet  Take 1 tablet by mouth Every 6 (Six) Hours As Needed for Mild Pain .             Prenatal Vit-Fe Fumarate-FA (PRENATAL 27-) 27-1 MG tablet tablet  Take 1 tablet by mouth Daily.                 Discharge Disposition:  To Home    Discharge Condition:  Stable    Discharge Diet: regular    Activity at Discharge: pelvic rest for 6 weeks.  Postpartum lochia, mastitis, incision, and depression warning signs reviewed.    Follow-up Appointments  2 weeks with Dr Paul Sotelo CNM  18  8:39 AM

## 2018-05-03 NOTE — PROGRESS NOTES
[facility]   PROGRESS NOTE      Subjective     Patient reports:   Doing well, pain controlled with medication, ambulating around the room, fernando po    Objective      Vitals: Vital Signs Range for the last 24 hours  Temperature: Temp:  [97.8 °F (36.6 °C)-98.6 °F (37 °C)] 98.6 °F (37 °C)   Temp Source: Temp src: Oral   BP: BP: (112-131)/(64-73) 112/64   Pulse: Heart Rate:  [] 90   Respirations: Resp:  [16-18] 16   SPO2:     O2 Amount (l/min):     O2 Devices            Physical Exam    Lungs clear to auscultation bilaterally   Abdomen Soft, non-tender, normal bowel sounds; no bruits, organomegaly or masses.   Incision  healing well, no drainage, no erythema, no hernia, no swelling, well approximated   Extremities extremities normal, atraumatic, no cyanosis or edema     LABS:  Lab Results   Component Value Date    WBC 13.36 (H) 2018    HGB 7.5 (L) 2018    HCT 24.1 (L) 2018    MCV 75.4 (L) 2018     2018       Assessment/Plan      Active Problems:    Delivered by  section      Assessment:    Yadira Angela is Day 3  post-partum        Plan:  plan for discharge today.        Ayaka Sotelo CNM  5/3/2018  8:35 AM

## 2018-05-03 NOTE — LACTATION NOTE
Mom reports nursing is improving.  Mom is still pumping after breastfeeding.  Obtained 4 ml with last pump session.  Using syringes of EBM at breast.  Encouraged mom to follow up in the lactation clinic in 5-10 days.

## 2018-05-03 NOTE — DISCHARGE INSTRUCTIONS
Ibuprofen tablets and capsules  What is this medicine?  IBUPROFEN (eye BYOO proe fen) is a non-steroidal anti-inflammatory drug (NSAID). It is used for dental pain, fever, headaches or migraines, osteoarthritis, rheumatoid arthritis, or painful monthly periods. It can also relieve minor aches and pains caused by a cold, flu, or sore throat.  This medicine may be used for other purposes; ask your health care provider or pharmacist if you have questions.  COMMON BRAND NAME(S): Advil, Advil Eric Strength, Advil Migraine, Genpril, Ibren, IBU, Midol, Midol Cramps and Body Aches, Motrin, Motrin IB, Motrin Eric Strength, Motrin Migraine Pain, Carlos-8, Toxicology Saliva Collection  What should I tell my health care provider before I take this medicine?  They need to know if you have any of these conditions:  -asthma  -cigarette smoker  -drink more than 3 alcohol containing drinks a day  -heart disease or circulation problems such as heart failure or leg edema (fluid retention)  -high blood pressure  -kidney disease  -liver disease  -stomach bleeding or ulcers  -an unusual or allergic reaction to ibuprofen, aspirin, other NSAIDS, other medicines, foods, dyes, or preservatives  -pregnant or trying to get pregnant  -breast-feeding  How should I use this medicine?  Take this medicine by mouth with a glass of water. Follow the directions on the prescription label. Take this medicine with food if your stomach gets upset. Try to not lie down for at least 10 minutes after you take the medicine. Take your medicine at regular intervals. Do not take your medicine more often than directed.  A special MedGuide will be given to you by the pharmacist with each prescription and refill. Be sure to read this information carefully each time.  Talk to your pediatrician regarding the use of this medicine in children. Special care may be needed.  Overdosage: If you think you have taken too much of this medicine contact a poison control  center or emergency room at once.  NOTE: This medicine is only for you. Do not share this medicine with others.  What if I miss a dose?  If you miss a dose, take it as soon as you can. If it is almost time for your next dose, take only that dose. Do not take double or extra doses.  What may interact with this medicine?  Do not take this medicine with any of the following medications:  -cidofovir  -ketorolac  -methotrexate  -pemetrexed  This medicine may also interact with the following medications:  -alcohol  -aspirin  -diuretics  -lithium  -other drugs for inflammation like prednisone  -warfarin  This list may not describe all possible interactions. Give your health care provider a list of all the medicines, herbs, non-prescription drugs, or dietary supplements you use. Also tell them if you smoke, drink alcohol, or use illegal drugs. Some items may interact with your medicine.  What should I watch for while using this medicine?  Tell your doctor or healthcare professional if your symptoms do not start to get better or if they get worse.  This medicine does not prevent heart attack or stroke. In fact, this medicine may increase the chance of a heart attack or stroke. The chance may increase with longer use of this medicine and in people who have heart disease. If you take aspirin to prevent heart attack or stroke, talk with your doctor or health care professional.  Do not take other medicines that contain aspirin, ibuprofen, or naproxen with this medicine. Side effects such as stomach upset, nausea, or ulcers may be more likely to occur. Many medicines available without a prescription should not be taken with this medicine.  This medicine can cause ulcers and bleeding in the stomach and intestines at any time during treatment. Ulcers and bleeding can happen without warning symptoms and can cause death. To reduce your risk, do not smoke cigarettes or drink alcohol while you are taking this medicine.  You may get  drowsy or dizzy. Do not drive, use machinery, or do anything that needs mental alertness until you know how this medicine affects you. Do not stand or sit up quickly, especially if you are an older patient. This reduces the risk of dizzy or fainting spells.  This medicine can cause you to bleed more easily. Try to avoid damage to your teeth and gums when you brush or floss your teeth.  This medicine may be used to treat migraines. If you take migraine medicines for 10 or more days a month, your migraines may get worse. Keep a diary of headache days and medicine use. Contact your healthcare professional if your migraine attacks occur more frequently.  What side effects may I notice from receiving this medicine?  Side effects that you should report to your doctor or health care professional as soon as possible:  -allergic reactions like skin rash, itching or hives, swelling of the face, lips, or tongue  -severe stomach pain  -signs and symptoms of bleeding such as bloody or black, tarry stools; red or dark-brown urine; spitting up blood or brown material that looks like coffee grounds; red spots on the skin; unusual bruising or bleeding from the eye, gums, or nose  -signs and symptoms of a blood clot such as changes in vision; chest pain; severe, sudden headache; trouble speaking; sudden numbness or weakness of the face, arm, or leg  -unexplained weight gain or swelling  -unusually weak or tired  -yellowing of eyes or skin  Side effects that usually do not require medical attention (report to your doctor or health care professional if they continue or are bothersome):  -bruising  -diarrhea  -dizziness, drowsiness  -headache  -nausea, vomiting  This list may not describe all possible side effects. Call your doctor for medical advice about side effects. You may report side effects to FDA at 9-954-FDA-9184.  Where should I keep my medicine?  Keep out of the reach of children.  Store at room temperature between 15 and 30  degrees C (59 and 86 degrees F). Keep container tightly closed. Throw away any unused medicine after the expiration date.  NOTE: This sheet is a summary. It may not cover all possible information. If you have questions about this medicine, talk to your doctor, pharmacist, or health care provider.  © 2018 [employer] Standard (2014-08-19 10:48:02)  Acetaminophen; Oxycodone tablets  What is this medicine?  ACETAMINOPHEN; OXYCODONE (a set a MITCHELL leobardo fen; ox i KOE done) is a pain reliever. It is used to treat moderate to severe pain.  This medicine may be used for other purposes; ask your health care provider or pharmacist if you have questions.  COMMON BRAND NAME(S): Endocet, Magnacet, Narvox, Percocet, Perloxx, Primalev, Primlev, Roxicet, Xolox  What should I tell my health care provider before I take this medicine?  They need to know if you have any of these conditions:  -brain tumor  -Crohn's disease, inflammatory bowel disease, or ulcerative colitis  -drug abuse or addiction  -head injury  -heart or circulation problems  -if you often drink alcohol  -kidney disease or problems going to the bathroom  -liver disease  -lung disease, asthma, or breathing problems  -an unusual or allergic reaction to acetaminophen, oxycodone, other opioid analgesics, other medicines, foods, dyes, or preservatives  -pregnant or trying to get pregnant  -breast-feeding  How should I use this medicine?  Take this medicine by mouth with a full glass of water. Follow the directions on the prescription label. You can take it with or without food. If it upsets your stomach, take it with food. Take your medicine at regular intervals. Do not take it more often than directed.  A special MedGuide will be given to you by the pharmacist with each prescription and refill. Be sure to read this information carefully each time.  Talk to your pediatrician regarding the use of this medicine in children. Special care may be needed.  Overdosage: If you  think you have taken too much of this medicine contact a poison control center or emergency room at once.  NOTE: This medicine is only for you. Do not share this medicine with others.  What if I miss a dose?  If you miss a dose, take it as soon as you can. If it is almost time for your next dose, take only that dose. Do not take double or extra doses.  What may interact with this medicine?  This medicine may interact with the following medications:  -alcohol  -antihistamines for allergy, cough and cold  -antiviral medicines for HIV or AIDS  -atropine  -certain antibiotics like clarithromycin, erythromycin, linezolid, rifampin  -certain medicines for anxiety or sleep  -certain medicines for bladder problems like oxybutynin, tolterodine  -certain medicines for depression like amitriptyline, fluoxetine, sertraline  -certain medicines for fungal infections like ketoconazole, itraconazole, voriconazole  -certain medicines for migraine headache like almotriptan, eletriptan, frovatriptan, naratriptan, rizatriptan, sumatriptan, zolmitriptan  -certain medicines for nausea or vomiting like dolasetron, ondansetron, palonosetron  -certain medicines for Parkinson's disease like benztropine, trihexyphenidyl  -certain medicines for seizures like phenobarbital, phenytoin, primidone  -certain medicines for stomach problems like dicyclomine, hyoscyamine  -certain medicines for travel sickness like scopolamine  -diuretics  -general anesthetics like halothane, isoflurane, methoxyflurane, propofol  -ipratropium  -local anesthetics like lidocaine, pramoxine, tetracaine  -MAOIs like Carbex, Eldepryl, Marplan, Nardil, and Parnate  -medicines that relax muscles for surgery  -methylene blue  -nilotinib  -other medicines with acetaminophen  -other narcotic medicines for pain or cough  -phenothiazines like chlorpromazine, mesoridazine, prochlorperazine, thioridazine  This list may not describe all possible interactions. Give your health care  provider a list of all the medicines, herbs, non-prescription drugs, or dietary supplements you use. Also tell them if you smoke, drink alcohol, or use illegal drugs. Some items may interact with your medicine.  What should I watch for while using this medicine?  Tell your doctor or health care professional if your pain does not go away, if it gets worse, or if you have new or a different type of pain. You may develop tolerance to the medicine. Tolerance means that you will need a higher dose of the medication for pain relief. Tolerance is normal and is expected if you take this medicine for a long time.  Do not suddenly stop taking your medicine because you may develop a severe reaction. Your body becomes used to the medicine. This does NOT mean you are addicted. Addiction is a behavior related to getting and using a drug for a non-medical reason. If you have pain, you have a medical reason to take pain medicine. Your doctor will tell you how much medicine to take. If your doctor wants you to stop the medicine, the dose will be slowly lowered over time to avoid any side effects.  There are different types of narcotic medicines (opiates). If you take more than one type at the same time or if you are taking another medicine that also causes drowsiness, you may have more side effects. Give your health care provider a list of all medicines you use. Your doctor will tell you how much medicine to take. Do not take more medicine than directed. Call emergency for help if you have problems breathing or unusual sleepiness.  Do not take other medicines that contain acetaminophen with this medicine. Always read labels carefully. If you have questions, ask your doctor or pharmacist.  If you take too much acetaminophen get medical help right away. Too much acetaminophen can be very dangerous and cause liver damage. Even if you do not have symptoms, it is important to get help right away.  You may get drowsy or dizzy. Do not  drive, use machinery, or do anything that needs mental alertness until you know how this medicine affects you. Do not stand or sit up quickly, especially if you are an older patient. This reduces the risk of dizzy or fainting spells. Alcohol may interfere with the effect of this medicine. Avoid alcoholic drinks.  The medicine will cause constipation. Try to have a bowel movement at least every 2 to 3 days. If you do not have a bowel movement for 3 days, call your doctor or health care professional.  Your mouth may get dry. Chewing sugarless gum or sucking hard candy, and drinking plenty or water may help. Contact your doctor if the problem does not go away or is severe.  What side effects may I notice from receiving this medicine?  Side effects that you should report to your doctor or health care professional as soon as possible:  -allergic reactions like skin rash, itching or hives, swelling of the face, lips, or tongue  -breathing problems  -confusion  -redness, blistering, peeling or loosening of the skin, including inside the mouth  -signs and symptoms of liver injury like dark yellow or brown urine; general ill feeling or flu-like symptoms; light-colored stools; loss of appetite; nausea; right upper belly pain; unusually weak or tired; yellowing of the eyes or skin  -signs and symptoms of low blood pressure like dizziness; feeling faint or lightheaded, falls; unusually weak or tired  -trouble passing urine or change in the amount of urine  Side effects that usually do not require medical attention (report to your doctor or health care professional if they continue or are bothersome):  -constipation  -dry mouth  -nausea, vomiting  -tiredness  This list may not describe all possible side effects. Call your doctor for medical advice about side effects. You may report side effects to FDA at 4-831-FDA-5506.  Where should I keep my medicine?  Keep out of the reach of children. This medicine can be abused. Keep your  medicine in a safe place to protect it from theft. Do not share this medicine with anyone. Selling or giving away this medicine is dangerous and against the law.  This medicine may cause accidental overdose and death if it taken by other adults, children, or pets. Mix any unused medicine with a substance like cat litter or coffee grounds. Then throw the medicine away in a sealed container like a sealed bag or a coffee can with a lid. Do not use the medicine after the expiration date.  Store at room temperature between 20 and 25 degrees C (68 and 77 degrees F).  NOTE: This sheet is a summary. It may not cover all possible information. If you have questions about this medicine, talk to your doctor, pharmacist, or health care provider.  © 2018 [employer]/Gold Standard (2016-09-12 21:48:12)  Iron tablets, capsules, extended-release tablets  What is this medicine?  IRON (AHY berlin) replaces iron that is essential to healthy red blood cells. Iron is used to treat iron deficiency anemia. Anemia may cause problems like tiredness, shortness of breath, or slowed growth in children. Only take iron if your doctor has told you to. Do not treat yourself with iron if you are feeling tired. Most healthy people get enough iron in their diets, particularly if they eat cereals, meat, poultry, and fish.  This medicine may be used for other purposes; ask your health care provider or pharmacist if you have questions.  COMMON BRAND NAME(S): Bifera, Duofer, Feosol, Feosol Complete, Feosol Natural Release, Feratab, Ferate, Fergon, Ferretts, Ferrimin, Margot-Sequels, Ferrocite, Hemocyte, Nephro-Arturo, NovaFerrum, ProFe, Proferrin ES, Slow Fe, Slow Iron, Tandem  What should I tell my health care provider before I take this medicine?  They need to know if you have any of these conditions:  -frequently drink alcohol  -bowel disease  -hemolytic anemia  -iron overload (hemochromatosis, hemosiderosis)  -liver disease  -problems with swallowing  -stomach  ulcer or other stomach problems  -an unusual or allergic reaction to iron, other medicines, foods, dyes, or preservatives  -pregnant or trying to get pregnant  -breast-feeding  How should I use this medicine?  Take this medicine by mouth with a glass of water or fruit juice. Follow the directions on the prescription label. Swallow whole. Do not crush or chew. Take this medicine in an upright or sitting position. Try to take any bedtime doses at least 10 minutes before lying down. You may take this medicine with food. Take your medicine at regular intervals. Do not take your medicine more often than directed. Do not stop taking except on your doctor's advice.  Talk to your pediatrician regarding the use of this medicine in children. While this drug may be prescribed for selected conditions, precautions do apply.  Overdosage: If you think you have taken too much of this medicine contact a poison control center or emergency room at once.  NOTE: This medicine is only for you. Do not share this medicine with others.  What if I miss a dose?  If you miss a dose, take it as soon as you can. If it is almost time for your next dose, take only that dose. Do not take double or extra doses.  What may interact with this medicine?  If you are taking this iron product, you should not take iron in any other medicine or dietary supplement.  This medicine may also interact with the following medications:  -alendronate  -antacids  -cefdinir  -chloramphenicol  -cholestyramine  -deferoxamine  -dimercaprol  -etidronate  -medicines for stomach ulcers or other stomach problems  -pancreatic enzymes  -quinolone antibiotics (examples: Cipro, Floxin, Levaquin, Tequin and others)  -risedronate  -tetracycline antibiotics (examples: doxycycline, tetracycline, minocycline, and others)  -thyroid hormones  This list may not describe all possible interactions. Give your health care provider a list of all the medicines, herbs, non-prescription drugs,  or dietary supplements you use. Also tell them if you smoke, drink alcohol, or use illegal drugs. Some items may interact with your medicine.  What should I watch for while using this medicine?  Use iron supplements only as directed by your health care professional. You will need important blood work while you are taking this medicine. It may take 3 to 6 months of therapy to treat low iron levels. Pregnant women should follow the dose and length of iron treatment as directed by their doctors.  Do not use iron longer than prescribed, and do not take a higher dose than recommended. Long-term use may cause excess iron to build-up in the body.  Do not take iron with antacids. If you need to take an antacid, take it 2 hours after a dose of iron.  What side effects may I notice from receiving this medicine?  Side effects that you should report to your doctor or health care professional as soon as possible:  -allergic reactions like skin rash, itching or hives, swelling of the face, lips, or tongue  -blue lips, nails, or palms  -dark colored stools (this may be due to the iron, but can indicate a more serious condition)  -drowsiness  -pain with or difficulty swallowing  -pale or clammy skin  -seizures  -stomach pain  -unusually weak or tired  -vomiting  -weak, fast, or irregular heartbeat  Side effects that usually do not require medical attention (report to your doctor or health care professional if they continue or are bothersome):  -constipation  -indigestion  -nausea or stomach upset  This list may not describe all possible side effects. Call your doctor for medical advice about side effects. You may report side effects to FDA at 5-195-FDA-8658.  Where should I keep my medicine?  Keep out of the reach of children. Even small amounts of iron can be harmful to a child.  Store at room temperature between 15 and 30 degrees C (59 and 86 degrees F). Keep container tightly closed. Throw away any unused medicine after the  expiration date.  NOTE: This sheet is a summary. It may not cover all possible information. If you have questions about this medicine, talk to your doctor, pharmacist, or health care provider.  © 2018 [employer]/Gold Standard (2009-05-05 17:03:41)

## 2018-05-03 NOTE — PLAN OF CARE
Problem: Patient Care Overview  Goal: Plan of Care Review  Outcome: Ongoing (interventions implemented as appropriate)   18 0622   Plan of Care Review   Progress improving   Coping/Psychosocial   Plan of Care Reviewed With patient     Goal: Individualization and Mutuality  Outcome: Ongoing (interventions implemented as appropriate)   18 0950   Individualization   Patient Specific Goals (Include Timeframe) wishes to breastfeed   Patient Specific Interventions education provided     Goal: Discharge Needs Assessment  Outcome: Ongoing (interventions implemented as appropriate)      Problem: Postpartum ( Delivery) (Adult,Obstetrics,Pediatric)  Goal: Signs and Symptoms of Listed Potential Problems Will be Absent, Minimized or Managed (Postpartum)  Outcome: Ongoing (interventions implemented as appropriate)   18 0622   Goal/Outcome Evaluation   Problems Assessed (Postpartum ) all   Problems Present (Postpartum ) none       Problem: Breastfeeding (Adult,Obstetrics,Pediatric)  Goal: Signs and Symptoms of Listed Potential Problems Will be Absent, Minimized or Managed (Breastfeeding)  Outcome: Ongoing (interventions implemented as appropriate)   18 0622   Goal/Outcome Evaluation   Problems Assessed (Breastfeeding) all   Problems Present (Breastfeeding) none

## 2019-11-20 ENCOUNTER — LAB (OUTPATIENT)
Dept: LAB | Facility: HOSPITAL | Age: 24
End: 2019-11-20

## 2019-11-20 ENCOUNTER — TRANSCRIBE ORDERS (OUTPATIENT)
Dept: LAB | Facility: HOSPITAL | Age: 24
End: 2019-11-20

## 2019-11-20 DIAGNOSIS — Z34.81 PRENATAL CARE, SUBSEQUENT PREGNANCY, FIRST TRIMESTER: ICD-10-CM

## 2019-11-20 DIAGNOSIS — Z3A.10 10 WEEKS GESTATION OF PREGNANCY: ICD-10-CM

## 2019-11-20 DIAGNOSIS — Z3A.10 10 WEEKS GESTATION OF PREGNANCY: Primary | ICD-10-CM

## 2019-11-20 LAB
ABO GROUP BLD: NORMAL
BASOPHILS # BLD AUTO: 0.05 10*3/MM3 (ref 0–0.2)
BASOPHILS NFR BLD AUTO: 0.6 % (ref 0–1.5)
BLD GP AB SCN SERPL QL: NEGATIVE
DEPRECATED RDW RBC AUTO: 42.4 FL (ref 37–54)
EOSINOPHIL # BLD AUTO: 0.12 10*3/MM3 (ref 0–0.4)
EOSINOPHIL NFR BLD AUTO: 1.3 % (ref 0.3–6.2)
ERYTHROCYTE [DISTWIDTH] IN BLOOD BY AUTOMATED COUNT: 14.4 % (ref 12.3–15.4)
GLUCOSE BLD-MCNC: 79 MG/DL (ref 65–99)
HBV SURFACE AG SERPL QL IA: NORMAL
HCT VFR BLD AUTO: 35.2 % (ref 34–46.6)
HCV AB SER DONR QL: NORMAL
HGB BLD-MCNC: 11.5 G/DL (ref 12–15.9)
HIV1+2 AB SER QL: NORMAL
IMM GRANULOCYTES # BLD AUTO: 0.02 10*3/MM3 (ref 0–0.05)
IMM GRANULOCYTES NFR BLD AUTO: 0.2 % (ref 0–0.5)
LYMPHOCYTES # BLD AUTO: 1.63 10*3/MM3 (ref 0.7–3.1)
LYMPHOCYTES NFR BLD AUTO: 17.9 % (ref 19.6–45.3)
MCH RBC QN AUTO: 26.7 PG (ref 26.6–33)
MCHC RBC AUTO-ENTMCNC: 32.7 G/DL (ref 31.5–35.7)
MCV RBC AUTO: 81.9 FL (ref 79–97)
MONOCYTES # BLD AUTO: 0.53 10*3/MM3 (ref 0.1–0.9)
MONOCYTES NFR BLD AUTO: 5.8 % (ref 5–12)
NEUTROPHILS # BLD AUTO: 6.74 10*3/MM3 (ref 1.7–7)
NEUTROPHILS NFR BLD AUTO: 74.2 % (ref 42.7–76)
NRBC BLD AUTO-RTO: 0 /100 WBC (ref 0–0.2)
PLATELET # BLD AUTO: 299 10*3/MM3 (ref 140–450)
PMV BLD AUTO: 10.3 FL (ref 6–12)
RBC # BLD AUTO: 4.3 10*6/MM3 (ref 3.77–5.28)
RH BLD: POSITIVE
WBC NRBC COR # BLD: 9.09 10*3/MM3 (ref 3.4–10.8)

## 2019-11-20 PROCEDURE — 86901 BLOOD TYPING SEROLOGIC RH(D): CPT

## 2019-11-20 PROCEDURE — 86803 HEPATITIS C AB TEST: CPT

## 2019-11-20 PROCEDURE — 80081 OBSTETRIC PANEL INC HIV TSTG: CPT

## 2019-11-20 PROCEDURE — 86900 BLOOD TYPING SEROLOGIC ABO: CPT

## 2019-11-20 PROCEDURE — 86850 RBC ANTIBODY SCREEN: CPT

## 2019-11-20 PROCEDURE — 82947 ASSAY GLUCOSE BLOOD QUANT: CPT | Performed by: OBSTETRICS & GYNECOLOGY

## 2019-11-20 PROCEDURE — 36415 COLL VENOUS BLD VENIPUNCTURE: CPT | Performed by: OBSTETRICS & GYNECOLOGY

## 2019-11-21 LAB
RPR SER QL: NORMAL
RUBV IGG SERPL IA-ACNC: POSITIVE

## 2020-03-26 ENCOUNTER — LAB (OUTPATIENT)
Dept: LAB | Facility: HOSPITAL | Age: 25
End: 2020-03-26

## 2020-03-26 ENCOUNTER — TRANSCRIBE ORDERS (OUTPATIENT)
Dept: LAB | Facility: HOSPITAL | Age: 25
End: 2020-03-26

## 2020-03-26 DIAGNOSIS — Z3A.28 28 WEEKS GESTATION OF PREGNANCY: ICD-10-CM

## 2020-03-26 DIAGNOSIS — Z34.83 PRENATAL CARE, SUBSEQUENT PREGNANCY, THIRD TRIMESTER: ICD-10-CM

## 2020-03-26 DIAGNOSIS — Z3A.28 28 WEEKS GESTATION OF PREGNANCY: Primary | ICD-10-CM

## 2020-03-26 LAB
BLD GP AB SCN SERPL QL: NEGATIVE
DEPRECATED RDW RBC AUTO: 35 FL (ref 37–54)
ERYTHROCYTE [DISTWIDTH] IN BLOOD BY AUTOMATED COUNT: 12.2 % (ref 12.3–15.4)
GLUCOSE 1H P 100 G GLC PO SERPL-MCNC: 114 MG/DL (ref 65–140)
HCT VFR BLD AUTO: 30 % (ref 34–46.6)
HGB BLD-MCNC: 9.4 G/DL (ref 12–15.9)
MCH RBC QN AUTO: 24.9 PG (ref 26.6–33)
MCHC RBC AUTO-ENTMCNC: 31.3 G/DL (ref 31.5–35.7)
MCV RBC AUTO: 79.4 FL (ref 79–97)
PLATELET # BLD AUTO: 276 10*3/MM3 (ref 140–450)
PMV BLD AUTO: 10.1 FL (ref 6–12)
RBC # BLD AUTO: 3.78 10*6/MM3 (ref 3.77–5.28)
WBC NRBC COR # BLD: 12.27 10*3/MM3 (ref 3.4–10.8)

## 2020-03-26 PROCEDURE — 85027 COMPLETE CBC AUTOMATED: CPT | Performed by: OBSTETRICS & GYNECOLOGY

## 2020-03-26 PROCEDURE — 86850 RBC ANTIBODY SCREEN: CPT

## 2020-03-26 PROCEDURE — 36415 COLL VENOUS BLD VENIPUNCTURE: CPT | Performed by: OBSTETRICS & GYNECOLOGY

## 2020-03-26 PROCEDURE — 82950 GLUCOSE TEST: CPT | Performed by: OBSTETRICS & GYNECOLOGY

## 2020-05-28 ENCOUNTER — LAB (OUTPATIENT)
Dept: LAB | Facility: HOSPITAL | Age: 25
End: 2020-05-28

## 2020-05-28 ENCOUNTER — TRANSCRIBE ORDERS (OUTPATIENT)
Dept: LAB | Facility: HOSPITAL | Age: 25
End: 2020-05-28

## 2020-05-28 DIAGNOSIS — Z3A.37 37 WEEKS GESTATION OF PREGNANCY: ICD-10-CM

## 2020-05-28 DIAGNOSIS — Z3A.37 37 WEEKS GESTATION OF PREGNANCY: Primary | ICD-10-CM

## 2020-05-28 DIAGNOSIS — Z34.83 PRENATAL CARE, SUBSEQUENT PREGNANCY, THIRD TRIMESTER: ICD-10-CM

## 2020-05-28 LAB
ALBUMIN SERPL-MCNC: 3.8 G/DL (ref 3.5–5.2)
ALBUMIN/GLOB SERPL: 1.3 G/DL
ALP SERPL-CCNC: 100 U/L (ref 39–117)
ALT SERPL W P-5'-P-CCNC: 12 U/L (ref 1–33)
ANION GAP SERPL CALCULATED.3IONS-SCNC: 12.2 MMOL/L (ref 5–15)
AST SERPL-CCNC: 21 U/L (ref 1–32)
BILIRUB SERPL-MCNC: 0.3 MG/DL (ref 0.2–1.2)
BUN BLD-MCNC: 8 MG/DL (ref 6–20)
BUN/CREAT SERPL: 17.4 (ref 7–25)
CALCIUM SPEC-SCNC: 9.1 MG/DL (ref 8.6–10.5)
CHLORIDE SERPL-SCNC: 102 MMOL/L (ref 98–107)
CO2 SERPL-SCNC: 21.8 MMOL/L (ref 22–29)
CREAT BLD-MCNC: 0.46 MG/DL (ref 0.57–1)
GFR SERPL CREATININE-BSD FRML MDRD: >150 ML/MIN/1.73
GLOBULIN UR ELPH-MCNC: 3 GM/DL
GLUCOSE BLD-MCNC: 77 MG/DL (ref 65–99)
POTASSIUM BLD-SCNC: 4.5 MMOL/L (ref 3.5–5.2)
PROT SERPL-MCNC: 6.8 G/DL (ref 6–8.5)
SODIUM BLD-SCNC: 136 MMOL/L (ref 136–145)

## 2020-05-28 PROCEDURE — 36415 COLL VENOUS BLD VENIPUNCTURE: CPT

## 2020-05-28 PROCEDURE — 80053 COMPREHEN METABOLIC PANEL: CPT

## 2020-05-28 PROCEDURE — 82239 BILE ACIDS TOTAL: CPT

## 2020-05-29 LAB — BILE AC SERPL-SCNC: 2 UMOL/L (ref 0–10)

## 2020-06-04 ENCOUNTER — HOSPITAL ENCOUNTER (INPATIENT)
Facility: HOSPITAL | Age: 25
LOS: 2 days | Discharge: HOME | End: 2020-06-06
Attending: OBSTETRICS & GYNECOLOGY | Admitting: OBSTETRICS & GYNECOLOGY

## 2020-06-04 ENCOUNTER — ANESTHESIA EVENT (OUTPATIENT)
Dept: LABOR AND DELIVERY | Facility: HOSPITAL | Age: 25
End: 2020-06-04

## 2020-06-04 ENCOUNTER — ANESTHESIA (OUTPATIENT)
Dept: LABOR AND DELIVERY | Facility: HOSPITAL | Age: 25
End: 2020-06-04

## 2020-06-04 PROBLEM — Z34.90 TERM PREGNANCY: Status: ACTIVE | Noted: 2020-06-04

## 2020-06-04 PROBLEM — Z34.90 TERM PREGNANCY: Status: RESOLVED | Noted: 2020-06-04 | Resolved: 2020-06-04

## 2020-06-04 LAB
ABO GROUP BLD: NORMAL
ALP SERPL-CCNC: 83 U/L (ref 39–117)
ALT SERPL W P-5'-P-CCNC: 9 U/L (ref 1–33)
AST SERPL-CCNC: 17 U/L (ref 1–32)
BILIRUB SERPL-MCNC: <0.2 MG/DL (ref 0.2–1.2)
BLD GP AB SCN SERPL QL: NEGATIVE
CREAT BLD-MCNC: 0.41 MG/DL (ref 0.57–1)
DEPRECATED RDW RBC AUTO: 40.6 FL (ref 37–54)
ERYTHROCYTE [DISTWIDTH] IN BLOOD BY AUTOMATED COUNT: 15.6 % (ref 12.3–15.4)
HCT VFR BLD AUTO: 29.9 % (ref 34–46.6)
HGB BLD-MCNC: 8.9 G/DL (ref 12–15.9)
LDH SERPL-CCNC: 142 U/L (ref 135–214)
MCH RBC QN AUTO: 21.8 PG (ref 26.6–33)
MCHC RBC AUTO-ENTMCNC: 29.8 G/DL (ref 31.5–35.7)
MCV RBC AUTO: 73.1 FL (ref 79–97)
PLATELET # BLD AUTO: 302 10*3/MM3 (ref 140–450)
PMV BLD AUTO: 10 FL (ref 6–12)
RBC # BLD AUTO: 4.09 10*6/MM3 (ref 3.77–5.28)
RH BLD: POSITIVE
T&S EXPIRATION DATE: NORMAL
URATE SERPL-MCNC: 3.5 MG/DL (ref 2.4–5.7)
WBC NRBC COR # BLD: 13.34 10*3/MM3 (ref 3.4–10.8)

## 2020-06-04 PROCEDURE — 84450 TRANSFERASE (AST) (SGOT): CPT | Performed by: OBSTETRICS & GYNECOLOGY

## 2020-06-04 PROCEDURE — 25010000002 ONDANSETRON PER 1 MG: Performed by: ANESTHESIOLOGY

## 2020-06-04 PROCEDURE — 25010000003 MORPHINE PER 10 MG: Performed by: ANESTHESIOLOGY

## 2020-06-04 PROCEDURE — 86850 RBC ANTIBODY SCREEN: CPT | Performed by: OBSTETRICS & GYNECOLOGY

## 2020-06-04 PROCEDURE — 86901 BLOOD TYPING SEROLOGIC RH(D): CPT | Performed by: OBSTETRICS & GYNECOLOGY

## 2020-06-04 PROCEDURE — 25010000002 DIPHENHYDRAMINE PER 50 MG: Performed by: ANESTHESIOLOGY

## 2020-06-04 PROCEDURE — 25010000003 CEFAZOLIN IN DEXTROSE 2-4 GM/100ML-% SOLUTION: Performed by: OBSTETRICS & GYNECOLOGY

## 2020-06-04 PROCEDURE — 84550 ASSAY OF BLOOD/URIC ACID: CPT | Performed by: OBSTETRICS & GYNECOLOGY

## 2020-06-04 PROCEDURE — 25010000002 METOCLOPRAMIDE PER 10 MG: Performed by: ANESTHESIOLOGY

## 2020-06-04 PROCEDURE — 51703 INSERT BLADDER CATH COMPLEX: CPT

## 2020-06-04 PROCEDURE — 84075 ASSAY ALKALINE PHOSPHATASE: CPT | Performed by: OBSTETRICS & GYNECOLOGY

## 2020-06-04 PROCEDURE — 83615 LACTATE (LD) (LDH) ENZYME: CPT | Performed by: OBSTETRICS & GYNECOLOGY

## 2020-06-04 PROCEDURE — 86900 BLOOD TYPING SEROLOGIC ABO: CPT | Performed by: OBSTETRICS & GYNECOLOGY

## 2020-06-04 PROCEDURE — 84460 ALANINE AMINO (ALT) (SGPT): CPT | Performed by: OBSTETRICS & GYNECOLOGY

## 2020-06-04 PROCEDURE — 25010000002 FENTANYL CITRATE (PF) 100 MCG/2ML SOLUTION: Performed by: ANESTHESIOLOGY

## 2020-06-04 PROCEDURE — 59025 FETAL NON-STRESS TEST: CPT

## 2020-06-04 PROCEDURE — 82565 ASSAY OF CREATININE: CPT | Performed by: OBSTETRICS & GYNECOLOGY

## 2020-06-04 PROCEDURE — 82247 BILIRUBIN TOTAL: CPT | Performed by: OBSTETRICS & GYNECOLOGY

## 2020-06-04 PROCEDURE — 85027 COMPLETE CBC AUTOMATED: CPT | Performed by: OBSTETRICS & GYNECOLOGY

## 2020-06-04 RX ORDER — MISOPROSTOL 200 UG/1
800 TABLET ORAL AS NEEDED
Status: DISCONTINUED | OUTPATIENT
Start: 2020-06-04 | End: 2020-06-04 | Stop reason: HOSPADM

## 2020-06-04 RX ORDER — HYDROCORTISONE 25 MG/G
1 CREAM TOPICAL AS NEEDED
Status: DISCONTINUED | OUTPATIENT
Start: 2020-06-04 | End: 2020-06-06 | Stop reason: HOSPADM

## 2020-06-04 RX ORDER — MORPHINE SULFATE 0.5 MG/ML
INJECTION, SOLUTION EPIDURAL; INTRATHECAL; INTRAVENOUS
Status: COMPLETED | OUTPATIENT
Start: 2020-06-04 | End: 2020-06-04

## 2020-06-04 RX ORDER — BUPIVACAINE HYDROCHLORIDE 7.5 MG/ML
INJECTION, SOLUTION INTRASPINAL
Status: COMPLETED | OUTPATIENT
Start: 2020-06-04 | End: 2020-06-04

## 2020-06-04 RX ORDER — OXYCODONE HYDROCHLORIDE AND ACETAMINOPHEN 5; 325 MG/1; MG/1
1 TABLET ORAL EVERY 4 HOURS PRN
Status: DISCONTINUED | OUTPATIENT
Start: 2020-06-04 | End: 2020-06-06 | Stop reason: HOSPADM

## 2020-06-04 RX ORDER — OXYCODONE AND ACETAMINOPHEN 10; 325 MG/1; MG/1
1 TABLET ORAL EVERY 4 HOURS PRN
Status: DISCONTINUED | OUTPATIENT
Start: 2020-06-04 | End: 2020-06-06 | Stop reason: HOSPADM

## 2020-06-04 RX ORDER — ONDANSETRON 4 MG/1
4 TABLET, FILM COATED ORAL EVERY 6 HOURS PRN
Status: DISCONTINUED | OUTPATIENT
Start: 2020-06-04 | End: 2020-06-06 | Stop reason: HOSPADM

## 2020-06-04 RX ORDER — OXYTOCIN-SODIUM CHLORIDE 0.9% IV SOLN 30 UNIT/500ML 30-0.9/5 UT/ML-%
SOLUTION INTRAVENOUS AS NEEDED
Status: DISCONTINUED | OUTPATIENT
Start: 2020-06-04 | End: 2020-06-04 | Stop reason: SURG

## 2020-06-04 RX ORDER — FLUOXETINE 10 MG/1
10 CAPSULE ORAL DAILY
Status: DISCONTINUED | OUTPATIENT
Start: 2020-06-04 | End: 2020-06-06 | Stop reason: HOSPADM

## 2020-06-04 RX ORDER — NALOXONE HCL 0.4 MG/ML
0.4 VIAL (ML) INJECTION
Status: DISCONTINUED | OUTPATIENT
Start: 2020-06-04 | End: 2020-06-06 | Stop reason: HOSPADM

## 2020-06-04 RX ORDER — CEFAZOLIN SODIUM 2 G/100ML
2 INJECTION, SOLUTION INTRAVENOUS ONCE
Status: COMPLETED | OUTPATIENT
Start: 2020-06-04 | End: 2020-06-04

## 2020-06-04 RX ORDER — PROMETHAZINE HYDROCHLORIDE 12.5 MG/1
12.5 TABLET ORAL EVERY 4 HOURS PRN
Status: DISCONTINUED | OUTPATIENT
Start: 2020-06-04 | End: 2020-06-06 | Stop reason: HOSPADM

## 2020-06-04 RX ORDER — ONDANSETRON 4 MG/1
4 TABLET, FILM COATED ORAL EVERY 6 HOURS PRN
Status: DISCONTINUED | OUTPATIENT
Start: 2020-06-04 | End: 2020-06-04 | Stop reason: HOSPADM

## 2020-06-04 RX ORDER — SODIUM CHLORIDE 0.9 % (FLUSH) 0.9 %
1-10 SYRINGE (ML) INJECTION AS NEEDED
Status: DISCONTINUED | OUTPATIENT
Start: 2020-06-04 | End: 2020-06-04

## 2020-06-04 RX ORDER — SODIUM CHLORIDE, SODIUM LACTATE, POTASSIUM CHLORIDE, CALCIUM CHLORIDE 600; 310; 30; 20 MG/100ML; MG/100ML; MG/100ML; MG/100ML
125 INJECTION, SOLUTION INTRAVENOUS CONTINUOUS
Status: DISCONTINUED | OUTPATIENT
Start: 2020-06-04 | End: 2020-06-04

## 2020-06-04 RX ORDER — FENTANYL CITRATE 50 UG/ML
INJECTION, SOLUTION INTRAMUSCULAR; INTRAVENOUS
Status: COMPLETED | OUTPATIENT
Start: 2020-06-04 | End: 2020-06-04

## 2020-06-04 RX ORDER — SODIUM CHLORIDE 0.9 % (FLUSH) 0.9 %
3 SYRINGE (ML) INJECTION EVERY 12 HOURS SCHEDULED
Status: DISCONTINUED | OUTPATIENT
Start: 2020-06-04 | End: 2020-06-04 | Stop reason: HOSPADM

## 2020-06-04 RX ORDER — MORPHINE SULFATE 2 MG/ML
2 INJECTION, SOLUTION INTRAMUSCULAR; INTRAVENOUS EVERY 4 HOURS PRN
Status: DISCONTINUED | OUTPATIENT
Start: 2020-06-04 | End: 2020-06-06 | Stop reason: HOSPADM

## 2020-06-04 RX ORDER — DIPHENHYDRAMINE HYDROCHLORIDE 50 MG/ML
25 INJECTION INTRAMUSCULAR; INTRAVENOUS EVERY 4 HOURS PRN
Status: DISCONTINUED | OUTPATIENT
Start: 2020-06-04 | End: 2020-06-06 | Stop reason: HOSPADM

## 2020-06-04 RX ORDER — LIDOCAINE HYDROCHLORIDE 10 MG/ML
5 INJECTION, SOLUTION EPIDURAL; INFILTRATION; INTRACAUDAL; PERINEURAL AS NEEDED
Status: DISCONTINUED | OUTPATIENT
Start: 2020-06-04 | End: 2020-06-04 | Stop reason: HOSPADM

## 2020-06-04 RX ORDER — SIMETHICONE 80 MG
80 TABLET,CHEWABLE ORAL 4 TIMES DAILY PRN
Status: DISCONTINUED | OUTPATIENT
Start: 2020-06-04 | End: 2020-06-06 | Stop reason: HOSPADM

## 2020-06-04 RX ORDER — PROMETHAZINE HYDROCHLORIDE 25 MG/ML
12.5 INJECTION, SOLUTION INTRAMUSCULAR; INTRAVENOUS EVERY 4 HOURS PRN
Status: DISCONTINUED | OUTPATIENT
Start: 2020-06-04 | End: 2020-06-06 | Stop reason: HOSPADM

## 2020-06-04 RX ORDER — IBUPROFEN 600 MG/1
600 TABLET ORAL EVERY 6 HOURS PRN
Status: DISCONTINUED | OUTPATIENT
Start: 2020-06-04 | End: 2020-06-04 | Stop reason: HOSPADM

## 2020-06-04 RX ORDER — FAMOTIDINE 10 MG/ML
INJECTION, SOLUTION INTRAVENOUS AS NEEDED
Status: DISCONTINUED | OUTPATIENT
Start: 2020-06-04 | End: 2020-06-04

## 2020-06-04 RX ORDER — ONDANSETRON 2 MG/ML
4 INJECTION INTRAMUSCULAR; INTRAVENOUS ONCE
Status: COMPLETED | OUTPATIENT
Start: 2020-06-04 | End: 2020-06-04

## 2020-06-04 RX ORDER — FERROUS SULFATE 325(65) MG
325 TABLET ORAL 2 TIMES DAILY WITH MEALS
Status: DISCONTINUED | OUTPATIENT
Start: 2020-06-04 | End: 2020-06-06 | Stop reason: HOSPADM

## 2020-06-04 RX ORDER — SODIUM CHLORIDE 0.9 % (FLUSH) 0.9 %
3 SYRINGE (ML) INJECTION EVERY 12 HOURS SCHEDULED
Status: DISCONTINUED | OUTPATIENT
Start: 2020-06-04 | End: 2020-06-04

## 2020-06-04 RX ORDER — ALUMINA, MAGNESIA, AND SIMETHICONE 2400; 2400; 240 MG/30ML; MG/30ML; MG/30ML
15 SUSPENSION ORAL EVERY 4 HOURS PRN
Status: DISCONTINUED | OUTPATIENT
Start: 2020-06-04 | End: 2020-06-06 | Stop reason: HOSPADM

## 2020-06-04 RX ORDER — METHYLERGONOVINE MALEATE 0.2 MG/ML
200 INJECTION INTRAVENOUS ONCE AS NEEDED
Status: DISCONTINUED | OUTPATIENT
Start: 2020-06-04 | End: 2020-06-04 | Stop reason: HOSPADM

## 2020-06-04 RX ORDER — METOCLOPRAMIDE HYDROCHLORIDE 5 MG/ML
INJECTION INTRAMUSCULAR; INTRAVENOUS AS NEEDED
Status: DISCONTINUED | OUTPATIENT
Start: 2020-06-04 | End: 2020-06-04 | Stop reason: SURG

## 2020-06-04 RX ORDER — NALOXONE HCL 0.4 MG/ML
0.4 VIAL (ML) INJECTION
Status: DISCONTINUED | OUTPATIENT
Start: 2020-06-04 | End: 2020-06-04 | Stop reason: SDUPTHER

## 2020-06-04 RX ORDER — TRISODIUM CITRATE DIHYDRATE AND CITRIC ACID MONOHYDRATE 500; 334 MG/5ML; MG/5ML
30 SOLUTION ORAL ONCE
Status: COMPLETED | OUTPATIENT
Start: 2020-06-04 | End: 2020-06-04

## 2020-06-04 RX ORDER — OXYTOCIN-SODIUM CHLORIDE 0.9% IV SOLN 30 UNIT/500ML 30-0.9/5 UT/ML-%
85 SOLUTION INTRAVENOUS ONCE
Status: DISCONTINUED | OUTPATIENT
Start: 2020-06-04 | End: 2020-06-04 | Stop reason: HOSPADM

## 2020-06-04 RX ORDER — DIPHENHYDRAMINE HCL 25 MG
25 CAPSULE ORAL EVERY 4 HOURS PRN
Status: DISCONTINUED | OUTPATIENT
Start: 2020-06-04 | End: 2020-06-06 | Stop reason: HOSPADM

## 2020-06-04 RX ORDER — ONDANSETRON 2 MG/ML
4 INJECTION INTRAMUSCULAR; INTRAVENOUS EVERY 6 HOURS PRN
Status: DISCONTINUED | OUTPATIENT
Start: 2020-06-04 | End: 2020-06-04 | Stop reason: HOSPADM

## 2020-06-04 RX ORDER — OXYCODONE HYDROCHLORIDE AND ACETAMINOPHEN 5; 325 MG/1; MG/1
1 TABLET ORAL EVERY 4 HOURS PRN
Status: DISCONTINUED | OUTPATIENT
Start: 2020-06-04 | End: 2020-06-04 | Stop reason: HOSPADM

## 2020-06-04 RX ORDER — SODIUM CHLORIDE 0.9 % (FLUSH) 0.9 %
1-10 SYRINGE (ML) INJECTION AS NEEDED
Status: DISCONTINUED | OUTPATIENT
Start: 2020-06-04 | End: 2020-06-04 | Stop reason: HOSPADM

## 2020-06-04 RX ORDER — FAMOTIDINE 10 MG/ML
INJECTION, SOLUTION INTRAVENOUS AS NEEDED
Status: DISCONTINUED | OUTPATIENT
Start: 2020-06-04 | End: 2020-06-04 | Stop reason: SURG

## 2020-06-04 RX ORDER — PRENATAL VIT/IRON FUM/FOLIC AC 27MG-0.8MG
1 TABLET ORAL DAILY
Status: DISCONTINUED | OUTPATIENT
Start: 2020-06-04 | End: 2020-06-05

## 2020-06-04 RX ORDER — LANOLIN
CREAM (ML) TOPICAL
Status: DISCONTINUED | OUTPATIENT
Start: 2020-06-04 | End: 2020-06-06 | Stop reason: HOSPADM

## 2020-06-04 RX ORDER — CARBOPROST TROMETHAMINE 250 UG/ML
250 INJECTION, SOLUTION INTRAMUSCULAR AS NEEDED
Status: DISCONTINUED | OUTPATIENT
Start: 2020-06-04 | End: 2020-06-04 | Stop reason: HOSPADM

## 2020-06-04 RX ORDER — SIMETHICONE 80 MG
80 TABLET,CHEWABLE ORAL 4 TIMES DAILY
Status: DISCONTINUED | OUTPATIENT
Start: 2020-06-04 | End: 2020-06-06 | Stop reason: HOSPADM

## 2020-06-04 RX ORDER — ONDANSETRON 2 MG/ML
4 INJECTION INTRAMUSCULAR; INTRAVENOUS EVERY 6 HOURS PRN
Status: DISCONTINUED | OUTPATIENT
Start: 2020-06-04 | End: 2020-06-06 | Stop reason: HOSPADM

## 2020-06-04 RX ORDER — ONDANSETRON 2 MG/ML
INJECTION INTRAMUSCULAR; INTRAVENOUS AS NEEDED
Status: DISCONTINUED | OUTPATIENT
Start: 2020-06-04 | End: 2020-06-04 | Stop reason: SURG

## 2020-06-04 RX ORDER — OXYTOCIN-SODIUM CHLORIDE 0.9% IV SOLN 30 UNIT/500ML 30-0.9/5 UT/ML-%
650 SOLUTION INTRAVENOUS ONCE
Status: DISCONTINUED | OUTPATIENT
Start: 2020-06-04 | End: 2020-06-04 | Stop reason: HOSPADM

## 2020-06-04 RX ORDER — IBUPROFEN 600 MG/1
600 TABLET ORAL EVERY 6 HOURS PRN
Status: DISCONTINUED | OUTPATIENT
Start: 2020-06-04 | End: 2020-06-06 | Stop reason: HOSPADM

## 2020-06-04 RX ORDER — LIDOCAINE HYDROCHLORIDE 10 MG/ML
5 INJECTION, SOLUTION EPIDURAL; INFILTRATION; INTRACAUDAL; PERINEURAL AS NEEDED
Status: DISCONTINUED | OUTPATIENT
Start: 2020-06-04 | End: 2020-06-04

## 2020-06-04 RX ORDER — FLUOXETINE HYDROCHLORIDE 20 MG/1
20 CAPSULE ORAL DAILY
COMMUNITY

## 2020-06-04 RX ADMIN — ONDANSETRON 4 MG: 2 INJECTION INTRAMUSCULAR; INTRAVENOUS at 04:47

## 2020-06-04 RX ADMIN — METOCLOPRAMIDE 10 MG: 5 INJECTION, SOLUTION INTRAMUSCULAR; INTRAVENOUS at 04:47

## 2020-06-04 RX ADMIN — SIMETHICONE 80 MG: 80 TABLET, CHEWABLE ORAL at 18:51

## 2020-06-04 RX ADMIN — SODIUM CHLORIDE, POTASSIUM CHLORIDE, SODIUM LACTATE AND CALCIUM CHLORIDE: 600; 310; 30; 20 INJECTION, SOLUTION INTRAVENOUS at 04:48

## 2020-06-04 RX ADMIN — SODIUM CHLORIDE, POTASSIUM CHLORIDE, SODIUM LACTATE AND CALCIUM CHLORIDE 1000 ML: 600; 310; 30; 20 INJECTION, SOLUTION INTRAVENOUS at 03:44

## 2020-06-04 RX ADMIN — MORPHINE SULFATE 0.15 MG: 0.5 INJECTION, SOLUTION EPIDURAL; INTRATHECAL; INTRAVENOUS at 04:39

## 2020-06-04 RX ADMIN — BUPIVACAINE HYDROCHLORIDE IN DEXTROSE 1.5 ML: 7.5 INJECTION, SOLUTION SUBARACHNOID at 04:39

## 2020-06-04 RX ADMIN — IBUPROFEN 600 MG: 600 TABLET, FILM COATED ORAL at 08:00

## 2020-06-04 RX ADMIN — FENTANYL CITRATE 15 MCG: 50 INJECTION, SOLUTION INTRAMUSCULAR; INTRAVENOUS at 04:39

## 2020-06-04 RX ADMIN — OXYTOCIN 500 ML: 10 INJECTION INTRAVENOUS at 05:01

## 2020-06-04 RX ADMIN — SIMETHICONE 80 MG: 80 TABLET, CHEWABLE ORAL at 20:40

## 2020-06-04 RX ADMIN — ONDANSETRON HYDROCHLORIDE 4 MG: 2 SOLUTION INTRAMUSCULAR; INTRAVENOUS at 06:09

## 2020-06-04 RX ADMIN — FAMOTIDINE 20 MG: 10 INJECTION, SOLUTION INTRAVENOUS at 04:47

## 2020-06-04 RX ADMIN — CEFAZOLIN SODIUM 2 G: 2 INJECTION, SOLUTION INTRAVENOUS at 04:20

## 2020-06-04 RX ADMIN — DIPHENHYDRAMINE HYDROCHLORIDE 25 MG: 50 INJECTION INTRAMUSCULAR; INTRAVENOUS at 06:09

## 2020-06-04 RX ADMIN — SODIUM CITRATE AND CITRIC ACID MONOHYDRATE 30 ML: 500; 334 SOLUTION ORAL at 04:23

## 2020-06-04 RX ADMIN — IBUPROFEN 600 MG: 600 TABLET, FILM COATED ORAL at 18:51

## 2020-06-04 RX ADMIN — FLUOXETINE 10 MG: 10 CAPSULE ORAL at 18:52

## 2020-06-04 NOTE — ANESTHESIA PROCEDURE NOTES
Spinal Block      Patient reassessed immediately prior to procedure    Patient location during procedure: OR  Start Time: 6/4/2020 4:38 AM  Stop Time: 6/4/2020 4:39 AM  Indication:at surgeon's request  Performed By  Anesthesiologist: Gabe Dupont MD  Preanesthetic Checklist  Completed: patient identified, site marked, surgical consent, pre-op evaluation, timeout performed, IV checked, risks and benefits discussed and monitors and equipment checked  Spinal Block Prep:  Sterile Tech:cap, gloves, sterile barriers and mask  Prep:Betadine  Patient Monitoring:EKG, continuous pulse oximetry and blood pressure monitoring  Spinal Block Procedure  Approach:midline  Guidance:palpation technique  Location:L2-L3  Needle Type:Lisa  Needle Gauge:25 G  Placement of Spinal needle event:cerebrospinal fluid aspirated  Paresthesia: no  Fluid Appearance:clear  Medications: bupivacaine in dextrose (MARCAINE SPINAL / Hyberbaric) 0.75-8.25 % injection, 1.5 mL  morphine PF (ASTRAMORPH) injection, 0.15 mg  fentaNYL citrate (PF) (SUBLIMAZE) injection, 15 mcg  Med Administered at 6/4/2020 4:39 AM   Post Assessment  Patient Tolerance:patient tolerated the procedure well with no apparent complications  Complications no

## 2020-06-04 NOTE — ANESTHESIA PREPROCEDURE EVALUATION
Anesthesia Evaluation     Patient summary reviewed and Nursing notes reviewed                Airway   Mallampati: I  TM distance: >3 FB  Neck ROM: full  No difficulty expected  Dental - normal exam     Pulmonary - negative pulmonary ROS and normal exam   Cardiovascular - negative cardio ROS and normal exam        Neuro/Psych- negative ROS  GI/Hepatic/Renal/Endo - negative ROS     Musculoskeletal (-) negative ROS    Abdominal  - normal exam    Bowel sounds: normal.   Substance History - negative use     OB/GYN negative ob/gyn ROS         Other                        Anesthesia Plan    ASA 2 - emergent     spinal       Anesthetic plan, all risks, benefits, and alternatives have been provided, discussed and informed consent has been obtained with: patient.  Use of blood products discussed with patient .   Plan discussed with attending.

## 2020-06-04 NOTE — ANESTHESIA POSTPROCEDURE EVALUATION
Patient: Yadira Angela    Procedure Summary     Date:  20 Room / Location:  [facility] LABOR DELIVERY   [facility] LABOR DELIVERY    Anesthesia Start:  430 Anesthesia Stop:  535    Procedure:   SECTION REPEAT (N/A Abdomen) Diagnosis:      Surgeon:  Akiko Miller MD Provider:  Gabe Dupont MD    Anesthesia Type:  spinal ASA Status:  2 - Emergent          Anesthesia Type: spinal    Vitals  Vitals Value Taken Time   BP 76/65 2020  6:30 AM   Temp 97.5 °F (36.4 °C) 2020  5:31 AM   Pulse 78 2020  6:32 AM   Resp 16 2020  5:31 AM   SpO2 100 % 2020  6:32 AM   Vitals shown include unvalidated device data.        Post Anesthesia Care and Evaluation    Patient location during evaluation: bedside  Patient participation: complete - patient participated  Level of consciousness: awake and alert  Pain score: 0  Pain management: adequate  Airway patency: patent  Anesthetic complications: No anesthetic complications    Cardiovascular status: acceptable  Respiratory status: acceptable  Hydration status: acceptable

## 2020-06-04 NOTE — PLAN OF CARE
Problem: Breastfeeding (Adult,Obstetrics,Pediatric)  Goal: Signs and Symptoms of Listed Potential Problems Will be Absent, Minimized or Managed (Breastfeeding)  Outcome: Ongoing (interventions implemented as appropriate)  Flowsheets (Taken 6/4/2020 1035)  Problems Assessed (Breastfeeding): ineffective breastfeeding; pain; situational response; skin breakdown  Intervention: Provide Support During Feeding Sessions  Flowsheets (Taken 6/4/2020 1035)  Parent/Child Attachment Promotion: caring behavior modeled; cue recognition promoted; skin-to-skin contact encouraged; strengths emphasized; positive reinforcement provided  Intervention: Promote Positive Maternal Experience  Flowsheets (Taken 6/4/2020 1035)  Supportive Measures: active listening utilized  Breastfeeding Support: diary/feeding log utilized; encouragement provided; lactation counseling provided  Intervention: Support Exclusive Breastfeeding Success  Flowsheets (Taken 6/4/2020 1035)  Breastfeeding Assistance: support offered; feeding cue recognition promoted; feeding on demand promoted

## 2020-06-04 NOTE — OP NOTE
Repeat Low Transverse  Section Procedure Note    Yadira Angela    2020     Indications: 1. IUP at 38w1d   2. SROM   3. Previous  x 1, desires repeat    Pre-operative Diagnosis: 38w1d    Post-operative Diagnosis: same    Findings: VMI in vertex presentation; normal appearing uterus, tubes, and ovaries    Birth Information  YOB: 2022   Time of birth: 5:00 AM   Delivering clinician: Akiko Miller   Sex: male   Delivery type: , Low Transverse   Breech type (if applicable):     Observed anomalies/comments:         Estimated Blood Loss:  500cc (see QBL flow sheet for quantitative blood loss)           Drains: Carl, 500cc clear urine                 Specimens: placenta (not sent to pathology)               Complications:  None; patient tolerated the procedure well.           Disposition: PACU - hemodynamically stable.           Condition: stable    Surgeon: Akiko Miller MD     Assistants: Scrub tech    Anesthesia: Spinal anesthesia    ASA Class: 2    Procedure Details   The patient was seen in the Holding Room. The risks, benefits, complications, treatment options, and expected outcomes were discussed with the patient.  The patient concurred with the proposed plan, giving informed consent.  The site of surgery was properly noted. The patient was taken to the Operating Room, identified as Yadira Angela and the procedure verified as  Delivery. A Time Out was held and the above information confirmed.    The patient was taken to the operating room where spinal anesthesia was placed and was found to be adequate. She was prepped and draped in the usual sterile fashion in the dorsal supine position with a leftward tilt. A Pfannenstiel skin incision was made with the scalpel and carried through to the underlying layer of fascia using the bovie. The fascia was then nicked in the midline, and the fascial incision was extended laterally. The superior aspect of the fascial  incision was then grasped with Kochers, elevated, and the underlying rectus muscles were dissected off bluntly and sharply. In a similar fashion, the inferior aspect of the fascial incision was grasped with the Kochers, elevated, and the underlying rectus muscles were dissected off bluntly and sharply. The rectus muscles were then  in the midline and the peritoneum was identified. The peritoneum was entered bluntly, and this incision was extended superiorly and inferiorly with good visualization of underlying structures.  The bladder blade was then inserted. The vesicouterine peritoneum was identified, grasped with the pickups, and entered sharply using the Metzenbaum scissors. The incision was extended laterally and a bladder flap was created digitally. The bladder blade was reinserted. The lower uterine segment was identified and a low transverse uterine incision was made using the scalpel. Delivered from vertex presentation was a [place] Measurements  Weight (oz): 115.13    Length (in): 20    Head circumference (in):      Chest circumference (in):      with apgars scores of         APGARS  One minute Five minutes Ten minutes Fifteen minutes Twenty minutes   Skin color: 0   0   1          Heart rate: 2   2   2          Grimace: 2   2   2           Muscle tone: 2   2   2           Breathin   2   2           Totals: 7   8   9           . The nose and mouth were suctioned, the cord was clamped and cut, and the infant was handed off to the awaiting Delivery Room Team. Cord blood was then obtained. The placenta was removed intact and appeared normal. The uterus was then exteriorized from the abdominal cavity and cleared of all clots and debris. The uterine outline, tubes and ovaries appeared normal. The hysterotomy was then closed using 0 monocryl in a running, locked fashion. Additional interrupted and figure of eight sutures of 1 monocryl were placed near the right apex due to oozing. The serosal edge of  the bladder flap had a small amount of oozing which was addressed with Bovie cautery. Hemostasis was observed.  The uterus was placed back inside the abdominal cavity, and the gutters were cleared of clots and debris. The hysterotomy was again reexamined and excellent hemostasis continued to be noted. The fascia was then reapproximated with running sutures of 0 vicryl. The incision was then irrigated, and the subcutaneous tissue was reapproximated with 3-0 plain. The skin was reapproximated with 4-0 monocryl and Skin glue.    Instrument, sponge, and needle counts were correct prior the abdominal closure and at the conclusion of the case.         Akiko Miller MD  05:31  6/4/2020

## 2020-06-04 NOTE — L&D DELIVERY NOTE
Repeat Low Transverse  Section Procedure Note    Yadira Angela    2020     Indications: 1. IUP at 38w1d   2. SROM   3. Previous  x 1, desires repeat    Pre-operative Diagnosis: 38w1d    Post-operative Diagnosis: same    Findings: VMI in vertex presentation; normal appearing uterus, tubes, and ovaries    Birth Information  YOB: 2022   Time of birth: 5:00 AM   Delivering clinician: Akiko Miller   Sex: male   Delivery type: , Low Transverse   Breech type (if applicable):     Observed anomalies/comments:         Estimated Blood Loss:  500cc (see QBL flow sheet for quantitative blood loss)           Drains: Carl, 500cc clear urine                 Specimens: placenta (not sent to pathology)               Complications:  None; patient tolerated the procedure well.           Disposition: PACU - hemodynamically stable.           Condition: stable    Surgeon: Akiko Miller MD     Assistants: Scrub tech    Anesthesia: Spinal anesthesia    ASA Class: 2    Procedure Details   The patient was seen in the Holding Room. The risks, benefits, complications, treatment options, and expected outcomes were discussed with the patient.  The patient concurred with the proposed plan, giving informed consent.  The site of surgery was properly noted. The patient was taken to the Operating Room, identified as Yadira Angela and the procedure verified as  Delivery. A Time Out was held and the above information confirmed.    The patient was taken to the operating room where spinal anesthesia was placed and was found to be adequate. She was prepped and draped in the usual sterile fashion in the dorsal supine position with a leftward tilt. A Pfannenstiel skin incision was made with the scalpel and carried through to the underlying layer of fascia using the bovie. The fascia was then nicked in the midline, and the fascial incision was extended laterally. The superior aspect of the fascial  incision was then grasped with Kochers, elevated, and the underlying rectus muscles were dissected off bluntly and sharply. In a similar fashion, the inferior aspect of the fascial incision was grasped with the Kochers, elevated, and the underlying rectus muscles were dissected off bluntly and sharply. The rectus muscles were then  in the midline and the peritoneum was identified. The peritoneum was entered bluntly, and this incision was extended superiorly and inferiorly with good visualization of underlying structures.  The bladder blade was then inserted. The vesicouterine peritoneum was identified, grasped with the pickups, and entered sharply using the Metzenbaum scissors. The incision was extended laterally and a bladder flap was created digitally. The bladder blade was reinserted. The lower uterine segment was identified and a low transverse uterine incision was made using the scalpel. Delivered from vertex presentation was a [place] Measurements  Weight (oz): 115.13    Length (in): 20    Head circumference (in):      Chest circumference (in):      with apgars scores of         APGARS  One minute Five minutes Ten minutes Fifteen minutes Twenty minutes   Skin color: 0   0   1          Heart rate: 2   2   2          Grimace: 2   2   2           Muscle tone: 2   2   2           Breathin   2   2           Totals: 7   8   9           . The nose and mouth were suctioned, the cord was clamped and cut, and the infant was handed off to the awaiting Delivery Room Team. Cord blood was then obtained. The placenta was removed intact and appeared normal. The uterus was then exteriorized from the abdominal cavity and cleared of all clots and debris. The uterine outline, tubes and ovaries appeared normal. The hysterotomy was then closed using 0 monocryl in a running, locked fashion. Additional interrupted and figure of eight sutures of 1 monocryl were placed near the right apex due to oozing. The serosal edge of  the bladder flap had a small amount of oozing which was addressed with Bovie cautery. Hemostasis was observed.  The uterus was placed back inside the abdominal cavity, and the gutters were cleared of clots and debris. The hysterotomy was again reexamined and excellent hemostasis continued to be noted. The fascia was then reapproximated with running sutures of 0 vicryl. The incision was then irrigated, and the subcutaneous tissue was reapproximated with 3-0 plain. The skin was reapproximated with 4-0 monocryl and Skin glue.    Instrument, sponge, and needle counts were correct prior the abdominal closure and at the conclusion of the case.         Akiko Miller MD  05:31  6/4/2020

## 2020-06-04 NOTE — LACTATION NOTE
06/04/20 1035   Maternal Information   Person Making Referral   (courtesy consult)   Maternal Reason for Referral   (mom reports breastfeeding is going well)   Equipment Type   Breast Pump Type double electric, personal  (mom has spectra pump at home)   Reproductive Interventions   Breastfeeding Assistance support offered;feeding cue recognition promoted;feeding on demand promoted   Breastfeeding Support diary/feeding log utilized;encouragement provided;lactation counseling provided   Coping/Psychosocial Interventions   Parent/Child Attachment Promotion caring behavior modeled;cue recognition promoted;skin-to-skin contact encouraged;strengths emphasized;positive reinforcement provided   Supportive Measures active listening utilized   Mom  first baby for 14 months. Teaching done as documented under Education. To call lactation services, if there are questions or concerns or if mom wants help with a feeding.

## 2020-06-04 NOTE — H&P
[facility]  Obstetric History and Physical    Chief Complaint   Patient presents with   • Rupture of Membranes       Subjective     Patient is a 24 y.o. female  currently at 38w1d, who presents with SROM. She was sleeping and awoke to a clear gush of fluid at 0130. She reports regular ctx since that time. She denies VB. She reports good fetal movement.    Her prenatal care has been uncomplicated. She initially had a low-lying placenta which resolved on re-scan at 28wks. She recently had a growth US for S>D, EFW 58%ile. Her previous obstetric/gynecological history is noted and is remarkable for previous  x 1, desires repeat.    The following portions of the patients history were reviewed and updated as appropriate: current medications, allergies, past medical history, past surgical history, past family history, past social history and problem list .       Prenatal Information:   Maternal Prenatal Labs  Blood Type No results found for: ABO   Rh Status No results found for: RH   Antibody Screen No results found for: ABSCRN   Gonnorhea No results found for: GCCX   Chlamydia No results found for: CLAMYDCU   RPR No results found for: RPR   Syphilis Antibody No results found for: SYPHILIS   Rubella No results found for: RUBELLAIGGIN   Hepatitis B Surface Antigen No results found for: HEPBSAG   HIV-1 Antibody No results found for: LABHIV1   Hepatitis C Antibody No results found for: HEPCAB   Rapid Urin Drug Screen No results found for: AMPMETHU, BARBITSCNUR, LABBENZSCN, LABMETHSCN, LABOPIASCN, THCURSCR, COCAINEUR, AMPHETSCREEN, PROPOXSCN, BUPRENORSCNU, METAMPSCNUR, OXYCODONESCN, TRICYCLICSCN   Group B Strep Culture No results found for: GBSANTIGEN           External Prenatal Results     Pregnancy Outside Results - Transcribed From Office Records - See Scanned Records For Details     Test Value Date Time    Hgb 9.4 g/dL 20 1115      11.5 g/dL 19 1111    Hct 30.0 % 20 1115      35.2 %  19 1111    ABO A  19 1111    Rh Positive  19 1111    Antibody Screen Negative  20 1115      Negative  19 1111    Glucose Fasting  mg/dL 18     Glucose Tolerance Test 1 hour 96mg/dL  18     Glucose Tolerance Test 3 hour       Gonorrhea (discrete)       Chlamydia (discrete)       RPR Non-Reactive  19 1111    VDRL       Syphilis Antibody       Rubella Positive  19 1111    HBsAg Non-Reactive  19 1111    Herpes Simplex Virus PCR       Herpes Simplex VIrus Culture       HIV Non-Reactive  19 1111    Hep C RNA Quant PCR       Hep C Antibody Non-Reactive  19 1111    AFP       Group B Strep Negative  18     GBS Susceptibility to Clindamycin       GBS Susceptibility to Erythromycin       Fetal Fibronectin       Genetic Testing, Maternal Blood             Drug Screening     Test Value Date Time    Urine Drug Screen Negative  17     Amphetamine Screen Negative  17 1040    Barbiturate Screen Negative  17 1040    Benzodiazepine Screen Negative  17 1040    Methadone Screen Negative  17 1040    Phencyclidine Screen Negative  17 1040    Opiates Screen Negative  17 1040    THC Screen Negative  17 1040    Cocaine Screen       Propoxyphene Screen Negative  17 1040    Buprenorphine Screen Negative  17 1040    Methamphetamine Screen       Oxycodone Screen Negative  17 1040    Tricyclic Antidepressants Screen Negative  17 1040                  Past OB History:       OB History    Para Term  AB Living   2 1 1 0 0 1   SAB TAB Ectopic Molar Multiple Live Births   0 0 0 0 0 1      # Outcome Date GA Lbr Srinivas/2nd Weight Sex Delivery Anes PTL Lv   2 Current            1 Term 18 39w3d  3561 g (7 lb 13.6 oz) M CS-LTranv EPI N JAVI      Complications: Fetal Intolerance      Name: BOB MARINELLIDAYANNAEDYTA      Apgar1: 8  Apgar5: 9       Past Medical History: Past Medical History:      Diagnosis Date   • Anxiety    • Disease of thyroid gland    • PUPP (pruritic urticarial papules and plaques of pregnancy)       Past Surgical History Past Surgical History:   Procedure Laterality Date   •  SECTION N/A 2018    Procedure:  SECTION PRIMARY;  Surgeon: Akiko Miller MD;  Location: [facility] LABOR DELIVERY;  Service: Obstetrics   • WISDOM TOOTH EXTRACTION        Family History: Family History   Problem Relation Age of Onset   • Hypertension Mother    • COPD Paternal Grandfather    • Breast cancer Paternal Grandmother    • Hypertension Maternal Grandmother    • COPD Maternal Grandmother    • Throat cancer Maternal Grandfather       Social History:  reports that she has never smoked. She has never used smokeless tobacco.   reports that she does not drink alcohol.   reports that she does not use drugs.                   General ROS Negative Findings:Headaches, Visual Changes, Epigastric pain, Anorexia, Nausia/Vomiting and Vaginal Bleeding    ROS      Objective       Vital Signs Range for the last 24 hours  Temperature: Temp:  [98.1 °F (36.7 °C)] 98.1 °F (36.7 °C)   Temp Source: Temp src: Oral   BP: BP: (117)/(84) 117/84   Pulse: Heart Rate:  [123] 123   Respirations: Resp:  [18] 18   SPO2:     O2 Amount (l/min):     O2 Devices     Weight: Weight:  [74.8 kg (165 lb)] 74.8 kg (165 lb)     Physical Examination:   General:   alert, appears stated age and cooperative   Skin:   normal   HEENT:  normal   Lungs:   normal respiratory effort, no respiratory distress   Heart:   tachycardic   Abdomen:  soft, gravid, nontender   Lower Extremeties  no edema   Pelvis:  exam deferred         Presentation: vtx on recent US   Cervix: Exam by:  not checked - grossly ruptured   Dilation:     Effacement:     Station:         Fetal Heart Rate Assessment   Method:  external monitor   Beats/min:     Baseline:  130bpm   Varibility:  moderate   Accels:  present (15x15)   Decels:  absent   Tracing Category:  I      Uterine Assessment   Method:  external toco   Frequency (min):  q2-3min   Ctx Count in 10 min:     Duration:     Intensity:     Intensity by IUPC:     Resting Tone:     Resting Tone by IUPC:     [place] Units:       Laboratory Results:   Lab Results (last 24 hours)     ** No results found for the last 24 hours. **        Radiology Review:  Imaging Results (Last 24 Hours)     ** No results found for the last 24 hours. **        Other Studies:    Assessment/Plan       Term pregnancy        Assessment:  1.  Intrauterine pregnancy at 38w1d weeks gestation with reassuring fetal status.    2.  SROM, labor  3.  Previous  x 1, desires repeat    Plan:  1. Admit to LD for RCS; ancef 2g IV preoperatively for surgical prophylaxis  2. Plan of care has been reviewed with patient.  3.  Risks, benefits of treatment plan have been discussed.  4.  All questions have been answered.        Akiko Miller MD  2020  03:54

## 2020-06-04 NOTE — ANESTHESIA POSTPROCEDURE EVALUATION
Patient: Yadira Angela    Procedure Summary     Date:  20 Room / Location:  [facility] LABOR DELIVERY   [facility] LABOR DELIVERY    Anesthesia Start:  430 Anesthesia Stop:  535    Procedure:   SECTION REPEAT (N/A Abdomen) Diagnosis:      Surgeon:  Akiko Miller MD Provider:  Gabe Dupont MD    Anesthesia Type:  spinal ASA Status:  2 - Emergent          Anesthesia Type: spinal    Vitals  Vitals Value Taken Time   BP 97/54 2020  9:30 AM   Temp 98.7 °F (37.1 °C) 2020  9:30 AM   Pulse 75 2020  9:30 AM   Resp 16 2020  9:30 AM   SpO2 100 % 2020  7:30 AM           Post Anesthesia Care and Evaluation    Patient location during evaluation: bedside  Patient participation: complete - patient participated  Level of consciousness: awake and alert  Pain management: adequate  Airway patency: patent  Anesthetic complications: No anesthetic complications    Cardiovascular status: acceptable  Respiratory status: acceptable  Hydration status: acceptable  Post Neuraxial Block status: Motor and sensory function returned to baseline and No signs or symptoms of PDPH

## 2020-06-05 LAB
BASOPHILS # BLD AUTO: 0.05 10*3/MM3 (ref 0–0.2)
BASOPHILS NFR BLD AUTO: 0.5 % (ref 0–1.5)
DEPRECATED RDW RBC AUTO: 41.8 FL (ref 37–54)
EOSINOPHIL # BLD AUTO: 0.13 10*3/MM3 (ref 0–0.4)
EOSINOPHIL NFR BLD AUTO: 1.2 % (ref 0.3–6.2)
ERYTHROCYTE [DISTWIDTH] IN BLOOD BY AUTOMATED COUNT: 15.8 % (ref 12.3–15.4)
HCT VFR BLD AUTO: 26.2 % (ref 34–46.6)
HGB BLD-MCNC: 7.6 G/DL (ref 12–15.9)
IMM GRANULOCYTES # BLD AUTO: 0.08 10*3/MM3 (ref 0–0.05)
IMM GRANULOCYTES NFR BLD AUTO: 0.7 % (ref 0–0.5)
LYMPHOCYTES # BLD AUTO: 2.01 10*3/MM3 (ref 0.7–3.1)
LYMPHOCYTES NFR BLD AUTO: 18.8 % (ref 19.6–45.3)
MCH RBC QN AUTO: 21.4 PG (ref 26.6–33)
MCHC RBC AUTO-ENTMCNC: 29 G/DL (ref 31.5–35.7)
MCV RBC AUTO: 73.8 FL (ref 79–97)
MONOCYTES # BLD AUTO: 0.74 10*3/MM3 (ref 0.1–0.9)
MONOCYTES NFR BLD AUTO: 6.9 % (ref 5–12)
NEUTROPHILS # BLD AUTO: 7.67 10*3/MM3 (ref 1.7–7)
NEUTROPHILS NFR BLD AUTO: 71.9 % (ref 42.7–76)
NRBC BLD AUTO-RTO: 0 /100 WBC (ref 0–0.2)
PLATELET # BLD AUTO: 229 10*3/MM3 (ref 140–450)
PMV BLD AUTO: 9.8 FL (ref 6–12)
RBC # BLD AUTO: 3.55 10*6/MM3 (ref 3.77–5.28)
WBC NRBC COR # BLD: 10.68 10*3/MM3 (ref 3.4–10.8)

## 2020-06-05 PROCEDURE — 85025 COMPLETE CBC W/AUTO DIFF WBC: CPT | Performed by: OBSTETRICS & GYNECOLOGY

## 2020-06-05 RX ORDER — PRENATAL VIT/IRON FUM/FOLIC AC 27MG-0.8MG
1 TABLET ORAL DAILY
Status: DISCONTINUED | OUTPATIENT
Start: 2020-06-05 | End: 2020-06-06 | Stop reason: HOSPADM

## 2020-06-05 RX ORDER — DOCUSATE SODIUM 100 MG/1
100 CAPSULE, LIQUID FILLED ORAL 2 TIMES DAILY
Status: DISCONTINUED | OUTPATIENT
Start: 2020-06-05 | End: 2020-06-06 | Stop reason: HOSPADM

## 2020-06-05 RX ADMIN — SIMETHICONE 80 MG: 80 TABLET, CHEWABLE ORAL at 12:57

## 2020-06-05 RX ADMIN — IBUPROFEN 600 MG: 600 TABLET, FILM COATED ORAL at 06:16

## 2020-06-05 RX ADMIN — OXYCODONE HYDROCHLORIDE AND ACETAMINOPHEN 1 TABLET: 5; 325 TABLET ORAL at 17:27

## 2020-06-05 RX ADMIN — FERROUS SULFATE TAB 325 MG (65 MG ELEMENTAL FE) 325 MG: 325 (65 FE) TAB at 17:27

## 2020-06-05 RX ADMIN — SIMETHICONE 80 MG: 80 TABLET, CHEWABLE ORAL at 23:05

## 2020-06-05 RX ADMIN — IBUPROFEN 600 MG: 600 TABLET, FILM COATED ORAL at 20:40

## 2020-06-05 RX ADMIN — IBUPROFEN 600 MG: 600 TABLET, FILM COATED ORAL at 12:57

## 2020-06-05 RX ADMIN — OXYCODONE HYDROCHLORIDE AND ACETAMINOPHEN 1 TABLET: 5; 325 TABLET ORAL at 03:14

## 2020-06-05 RX ADMIN — IBUPROFEN 600 MG: 600 TABLET, FILM COATED ORAL at 00:34

## 2020-06-05 RX ADMIN — DOCUSATE SODIUM 100 MG: 100 CAPSULE, LIQUID FILLED ORAL at 13:21

## 2020-06-05 RX ADMIN — Medication: at 08:11

## 2020-06-05 RX ADMIN — FLUOXETINE 10 MG: 10 CAPSULE ORAL at 20:40

## 2020-06-05 RX ADMIN — DOCUSATE SODIUM 100 MG: 100 CAPSULE, LIQUID FILLED ORAL at 20:40

## 2020-06-05 RX ADMIN — SIMETHICONE 80 MG: 80 TABLET, CHEWABLE ORAL at 08:12

## 2020-06-05 RX ADMIN — FERROUS SULFATE TAB 325 MG (65 MG ELEMENTAL FE) 325 MG: 325 (65 FE) TAB at 08:11

## 2020-06-05 RX ADMIN — SIMETHICONE 80 MG: 80 TABLET, CHEWABLE ORAL at 20:40

## 2020-06-05 RX ADMIN — OXYCODONE HYDROCHLORIDE AND ACETAMINOPHEN 1 TABLET: 5; 325 TABLET ORAL at 23:05

## 2020-06-05 NOTE — PLAN OF CARE
Problem: Patient Care Overview  Goal: Plan of Care Review  Outcome: Ongoing (interventions implemented as appropriate)  Flowsheets (Taken 6/5/2020 0433)  Progress: improving  Plan of Care Reviewed With: patient; spouse  Goal: Individualization and Mutuality  Outcome: Ongoing (interventions implemented as appropriate)  Goal: Discharge Needs Assessment  Outcome: Ongoing (interventions implemented as appropriate)  Goal: Interprofessional Rounds/Family Conf  Outcome: Ongoing (interventions implemented as appropriate)     Problem: Breastfeeding (Adult,Obstetrics,Pediatric)  Goal: Signs and Symptoms of Listed Potential Problems Will be Absent, Minimized or Managed (Breastfeeding)  Outcome: Ongoing (interventions implemented as appropriate)

## 2020-06-05 NOTE — PROGRESS NOTES
2020    Name:Yadira Angela    MR#:[id number]     PROGRESS NOTE:  Post-Op 1 S/P        Subjective   24 y.o. yo Female  s/p CS at 38w1d doing well. Pain well controlled, lochia appropriate.    Patient Active Problem List   Diagnosis   • Single liveborn, born in hospital, delivered by  section        Objective    Vitals  Temp:  Temp:  [98 °F (36.7 °C)-98.8 °F (37.1 °C)] 98 °F (36.7 °C)  Temp src: Oral  BP:  BP: ()/(54-62) 106/62  Pulse:  Heart Rate:  [67-89] 77  RR:   Resp:  [16-18] 16    General Awake, alert, no distress  Abdomen Soft, non-distended, fundus firm, below umbilicus, appropriately tender  Incision  Intact, no erythema or exudate  Extremities Calves NT bilaterally     I/O last 3 completed shifts:  In: 1500 [I.V.:1500]  Out: 4960 [Urine:4400; Blood:560]    LABS:   Lab Results   Component Value Date    WBC 13.34 (H) 2020    HGB 8.9 (L) 2020    HCT 29.9 (L) 2020    MCV 73.1 (L) 2020     2020       Infant: male       Assessment   1.  POD 1 from  Section   2.  Anemia, asymptomatic     Plan: Doing well.    D/C IV, advance diet, may shower  Continue FeSO4 BID  Desires discharge home tomorrow  Continue routine post-operative/postpartum care          Akiko Miller MD  2020 07:50

## 2020-06-06 VITALS
BODY MASS INDEX: 28.17 KG/M2 | OXYGEN SATURATION: 100 % | HEIGHT: 64 IN | RESPIRATION RATE: 16 BRPM | SYSTOLIC BLOOD PRESSURE: 111 MMHG | HEART RATE: 81 BPM | TEMPERATURE: 98.4 F | WEIGHT: 165 LBS | DIASTOLIC BLOOD PRESSURE: 69 MMHG

## 2020-06-06 RX ORDER — OXYCODONE HYDROCHLORIDE AND ACETAMINOPHEN 5; 325 MG/1; MG/1
1 TABLET ORAL EVERY 4 HOURS PRN
Qty: 30 TABLET | Refills: 0 | Status: SHIPPED | OUTPATIENT
Start: 2020-06-06 | End: 2020-06-14

## 2020-06-06 RX ORDER — IBUPROFEN 600 MG/1
600 TABLET ORAL EVERY 6 HOURS PRN
Qty: 60 TABLET | Refills: 0 | Status: SHIPPED | OUTPATIENT
Start: 2020-06-06 | End: 2020-12-22 | Stop reason: HOSPADM

## 2020-06-06 RX ADMIN — IBUPROFEN 600 MG: 600 TABLET, FILM COATED ORAL at 10:20

## 2020-06-06 RX ADMIN — DOCUSATE SODIUM 100 MG: 100 CAPSULE, LIQUID FILLED ORAL at 09:04

## 2020-06-06 RX ADMIN — FERROUS SULFATE TAB 325 MG (65 MG ELEMENTAL FE) 325 MG: 325 (65 FE) TAB at 09:04

## 2020-06-06 RX ADMIN — SIMETHICONE 80 MG: 80 TABLET, CHEWABLE ORAL at 09:04

## 2020-06-06 RX ADMIN — IBUPROFEN 600 MG: 600 TABLET, FILM COATED ORAL at 03:34

## 2020-06-06 RX ADMIN — PRENATAL VITAMINS-IRON FUMARATE 27 MG IRON-FOLIC ACID 0.8 MG TABLET 1 TABLET: at 09:04

## 2020-06-06 NOTE — DISCHARGE SUMMARY
[facility]   Discharge Summary      Patient: Yadira Angela      MR#:[id number]  Admission  Diagnosis: IUP @ 38w1d, SROM, previous  x 1, anemia  Problem List Items Addressed This Visit        Other    Single liveborn, born in hospital, delivered by  section - Primary    Relevant Medications    oxyCODONE-acetaminophen (PERCOCET) 5-325 MG per tablet          Discharge Diagnosis: POD#2 s/p RCS @ 38w1d, anemia (asymptomatic)  1. Single liveborn, born in hospital, delivered by  section        Date of Admission: 2020  Date of Discharge:  2020    Procedures:  , Low Transverse     2020    5:00 AM      Service:  Obstetrics    Hospital Course:  Patient underwent  section and remained in the hospital for 2 days.  During that time she remained afebrile and hemodynamically stable.  On the day of discharge, she was eating, ambulating and voiding without difficulty.      Labs  Lab Results   Component Value Date    WBC 10.68 2020    HGB 7.6 (L) 2020    HCT 26.2 (L) 2020    MCV 73.8 (L) 2020     2020    URICACID 3.5 2020    AST 17 2020    ALT 9 2020     2020     Results from last 7 days   Lab Units 20  0412   ABO TYPING  A   RH TYPING  Positive   ANTIBODY SCREEN  Negative       Discharge Medications     Discharge Medications      New Medications      Instructions Start Date   ibuprofen 600 MG tablet  Commonly known as:  ADVIL,MOTRIN   600 mg, Oral, Every 6 Hours PRN      oxyCODONE-acetaminophen 5-325 MG per tablet  Commonly known as:  PERCOCET   1 tablet, Oral, Every 4 Hours PRN         Continue These Medications      Instructions Start Date   ferrous sulfate 325 (65 FE) MG tablet   325 mg, Oral, 2 Times Daily With Meals      FLUoxetine 10 MG capsule  Commonly known as:  PROzac   10 mg, Oral, Daily      Prenatal 27-1 27-1 MG tablet tablet   1 tablet, Oral, Daily             Discharge  Disposition:  To Home    Discharge Condition:  Stable    Discharge Diet: regular    Activity at Discharge: pelvic rest    Follow-up Appointments  2 weeks - incision check    Akiko Miller MD  06/06/20  10:19

## 2020-06-06 NOTE — PLAN OF CARE
Problem: Patient Care Overview  Goal: Plan of Care Review  Outcome: Ongoing (interventions implemented as appropriate)  Flowsheets (Taken 6/6/2020 4989)  Progress: improving  Plan of Care Reviewed With: patient; spouse  Goal: Individualization and Mutuality  Outcome: Ongoing (interventions implemented as appropriate)  Goal: Discharge Needs Assessment  Outcome: Ongoing (interventions implemented as appropriate)  Goal: Interprofessional Rounds/Family Conf  Outcome: Ongoing (interventions implemented as appropriate)     Problem: Breastfeeding (Adult,Obstetrics,Pediatric)  Goal: Signs and Symptoms of Listed Potential Problems Will be Absent, Minimized or Managed (Breastfeeding)  Outcome: Ongoing (interventions implemented as appropriate)

## 2020-06-06 NOTE — PROGRESS NOTES
2020    Name:Yadira Angela    MR#:[id number]     PROGRESS NOTE:  Post-Op 2 S/P        Subjective   24 y.o. yo Female  s/p CS at 38w1d doing well. Pain well controlled, lochia appropriate, tolerating diet. She desires discharge home.    Patient Active Problem List   Diagnosis   • Single liveborn, born in hospital, delivered by  section        Objective    Vitals  Temp:  Temp:  [97.8 °F (36.6 °C)-98.5 °F (36.9 °C)] 98.4 °F (36.9 °C)  Temp src: Oral  BP:  BP: (108-111)/(55-69) 111/69  Pulse:  Heart Rate:  [70-84] 81  RR:   Resp:  [16-18] 16    General Awake, alert, no distress  Abdomen Soft, non-distended, fundus firm, below umbilicus, appropriately tender  Incision  Intact, no erythema or exudate  Extremities Calves NT bilaterally     I/O last 3 completed shifts:  In: -   Out: 4000 [Urine:4000]    LABS:   Lab Results   Component Value Date    WBC 10.68 2020    HGB 7.6 (L) 2020    HCT 26.2 (L) 2020    MCV 73.8 (L) 2020     2020       Infant: male       Assessment   1.  POD 2 from  Section   2.  Anemia, asymptomatic    Plan: Doing well.    Continue FeSO4 BID  D/C home  D/C instructions given, precautions reviewed.        Akiko Miller MD  2020 10:16

## 2020-12-21 ENCOUNTER — ANESTHESIA EVENT (OUTPATIENT)
Dept: PERIOP | Facility: HOSPITAL | Age: 25
End: 2020-12-21

## 2020-12-21 ENCOUNTER — APPOINTMENT (OUTPATIENT)
Dept: MRI IMAGING | Facility: HOSPITAL | Age: 25
End: 2020-12-21

## 2020-12-21 ENCOUNTER — HOSPITAL ENCOUNTER (OUTPATIENT)
Facility: HOSPITAL | Age: 25
Discharge: HOME | End: 2020-12-22
Attending: EMERGENCY MEDICINE | Admitting: EMERGENCY MEDICINE

## 2020-12-21 ENCOUNTER — ANESTHESIA (OUTPATIENT)
Dept: PERIOP | Facility: HOSPITAL | Age: 25
End: 2020-12-21

## 2020-12-21 DIAGNOSIS — K80.50 BILIARY COLIC: Primary | ICD-10-CM

## 2020-12-21 DIAGNOSIS — R11.2 NON-INTRACTABLE VOMITING WITH NAUSEA, UNSPECIFIED VOMITING TYPE: ICD-10-CM

## 2020-12-21 DIAGNOSIS — K81.0 ACUTE CHOLECYSTITIS: ICD-10-CM

## 2020-12-21 PROBLEM — R79.89 ELEVATED LFTS: Status: ACTIVE | Noted: 2020-12-21

## 2020-12-21 PROBLEM — D64.9 ANEMIA: Status: ACTIVE | Noted: 2020-12-21

## 2020-12-21 PROBLEM — R17 ELEVATED BILIRUBIN: Status: ACTIVE | Noted: 2020-12-21

## 2020-12-21 PROBLEM — F39 MOOD DISORDER (HCC): Status: ACTIVE | Noted: 2020-12-21

## 2020-12-21 PROBLEM — N39.0 ACUTE UTI (URINARY TRACT INFECTION): Status: ACTIVE | Noted: 2020-12-21

## 2020-12-21 LAB
ALBUMIN SERPL-MCNC: 4.7 G/DL (ref 3.5–5.2)
ALBUMIN/GLOB SERPL: 1.6 G/DL
ALP SERPL-CCNC: 171 U/L (ref 39–117)
ALT SERPL W P-5'-P-CCNC: 445 U/L (ref 1–33)
ANION GAP SERPL CALCULATED.3IONS-SCNC: 12 MMOL/L (ref 5–15)
APAP SERPL-MCNC: <5 MCG/ML (ref 0–30)
AST SERPL-CCNC: 900 U/L (ref 1–32)
B-HCG UR QL: NEGATIVE
BACTERIA UR QL AUTO: ABNORMAL /HPF
BASOPHILS # BLD AUTO: 0.03 10*3/MM3 (ref 0–0.2)
BASOPHILS NFR BLD AUTO: 0.6 % (ref 0–1.5)
BILIRUB SERPL-MCNC: 1.3 MG/DL (ref 0–1.2)
BILIRUB UR QL STRIP: NEGATIVE
BUN SERPL-MCNC: 17 MG/DL (ref 6–20)
BUN/CREAT SERPL: 23.9 (ref 7–25)
CALCIUM SPEC-SCNC: 9.5 MG/DL (ref 8.6–10.5)
CHLORIDE SERPL-SCNC: 106 MMOL/L (ref 98–107)
CLARITY UR: ABNORMAL
CO2 SERPL-SCNC: 26 MMOL/L (ref 22–29)
COLOR UR: ABNORMAL
CREAT SERPL-MCNC: 0.71 MG/DL (ref 0.57–1)
DEPRECATED RDW RBC AUTO: 40.5 FL (ref 37–54)
EOSINOPHIL # BLD AUTO: 0.03 10*3/MM3 (ref 0–0.4)
EOSINOPHIL NFR BLD AUTO: 0.6 % (ref 0.3–6.2)
ERYTHROCYTE [DISTWIDTH] IN BLOOD BY AUTOMATED COUNT: 13.5 % (ref 12.3–15.4)
FLUAV RNA RESP QL NAA+PROBE: NOT DETECTED
FLUBV RNA RESP QL NAA+PROBE: NOT DETECTED
GFR SERPL CREATININE-BSD FRML MDRD: 99 ML/MIN/1.73
GLOBULIN UR ELPH-MCNC: 2.9 GM/DL
GLUCOSE SERPL-MCNC: 121 MG/DL (ref 65–99)
GLUCOSE UR STRIP-MCNC: NEGATIVE MG/DL
HAV IGM SERPL QL IA: NORMAL
HBV CORE IGM SERPL QL IA: NORMAL
HBV SURFACE AG SERPL QL IA: NORMAL
HCT VFR BLD AUTO: 37.8 % (ref 34–46.6)
HCV AB SER DONR QL: NORMAL
HGB BLD-MCNC: 11.9 G/DL (ref 12–15.9)
HGB UR QL STRIP.AUTO: NEGATIVE
HOLD SPECIMEN: NORMAL
HOLD SPECIMEN: NORMAL
IMM GRANULOCYTES # BLD AUTO: 0.01 10*3/MM3 (ref 0–0.05)
IMM GRANULOCYTES NFR BLD AUTO: 0.2 % (ref 0–0.5)
INTERNAL NEGATIVE CONTROL: NEGATIVE
INTERNAL POSITIVE CONTROL: POSITIVE
KETONES UR QL STRIP: ABNORMAL
LEUKOCYTE ESTERASE UR QL STRIP.AUTO: ABNORMAL
LIPASE SERPL-CCNC: 25 U/L (ref 13–60)
LYMPHOCYTES # BLD AUTO: 0.95 10*3/MM3 (ref 0.7–3.1)
LYMPHOCYTES NFR BLD AUTO: 20.6 % (ref 19.6–45.3)
Lab: NORMAL
MCH RBC QN AUTO: 26 PG (ref 26.6–33)
MCHC RBC AUTO-ENTMCNC: 31.5 G/DL (ref 31.5–35.7)
MCV RBC AUTO: 82.7 FL (ref 79–97)
MONOCYTES # BLD AUTO: 0.39 10*3/MM3 (ref 0.1–0.9)
MONOCYTES NFR BLD AUTO: 8.4 % (ref 5–12)
NEUTROPHILS NFR BLD AUTO: 3.21 10*3/MM3 (ref 1.7–7)
NEUTROPHILS NFR BLD AUTO: 69.6 % (ref 42.7–76)
NITRITE UR QL STRIP: NEGATIVE
NRBC BLD AUTO-RTO: 0 /100 WBC (ref 0–0.2)
PH UR STRIP.AUTO: 7.5 [PH] (ref 5–8)
PLATELET # BLD AUTO: 255 10*3/MM3 (ref 140–450)
PMV BLD AUTO: 9.7 FL (ref 6–12)
POTASSIUM SERPL-SCNC: 3.7 MMOL/L (ref 3.5–5.2)
PROT SERPL-MCNC: 7.6 G/DL (ref 6–8.5)
PROT UR QL STRIP: ABNORMAL
RBC # BLD AUTO: 4.57 10*6/MM3 (ref 3.77–5.28)
RBC # UR: ABNORMAL /HPF
REF LAB TEST METHOD: ABNORMAL
SARS-COV-2 RNA RESP QL NAA+PROBE: NOT DETECTED
SODIUM SERPL-SCNC: 144 MMOL/L (ref 136–145)
SP GR UR STRIP: 1.03 (ref 1–1.03)
SQUAMOUS #/AREA URNS HPF: ABNORMAL /HPF
UROBILINOGEN UR QL STRIP: ABNORMAL
WBC # BLD AUTO: 4.62 10*3/MM3 (ref 3.4–10.8)
WBC UR QL AUTO: ABNORMAL /HPF
WHOLE BLOOD HOLD SPECIMEN: NORMAL
WHOLE BLOOD HOLD SPECIMEN: NORMAL

## 2020-12-21 PROCEDURE — 96375 TX/PRO/DX INJ NEW DRUG ADDON: CPT

## 2020-12-21 PROCEDURE — 99219 PR INITIAL OBSERVATION CARE/DAY 50 MINUTES: CPT | Performed by: SURGERY

## 2020-12-21 PROCEDURE — 25010000002 FENTANYL CITRATE (PF) 100 MCG/2ML SOLUTION: Performed by: NURSE ANESTHETIST, CERTIFIED REGISTERED

## 2020-12-21 PROCEDURE — 80307 DRUG TEST PRSMV CHEM ANLYZR: CPT | Performed by: EMERGENCY MEDICINE

## 2020-12-21 PROCEDURE — 96376 TX/PRO/DX INJ SAME DRUG ADON: CPT

## 2020-12-21 PROCEDURE — 25010000002 ONDANSETRON PER 1 MG: Performed by: NURSE ANESTHETIST, CERTIFIED REGISTERED

## 2020-12-21 PROCEDURE — 80053 COMPREHEN METABOLIC PANEL: CPT | Performed by: EMERGENCY MEDICINE

## 2020-12-21 PROCEDURE — 81025 URINE PREGNANCY TEST: CPT | Performed by: EMERGENCY MEDICINE

## 2020-12-21 PROCEDURE — G0378 HOSPITAL OBSERVATION PER HR: HCPCS

## 2020-12-21 PROCEDURE — 25010000002 DEXAMETHASONE PER 1 MG: Performed by: NURSE ANESTHETIST, CERTIFIED REGISTERED

## 2020-12-21 PROCEDURE — 25010000002 PROPOFOL 10 MG/ML EMULSION: Performed by: NURSE ANESTHETIST, CERTIFIED REGISTERED

## 2020-12-21 PROCEDURE — 25010000002 ONDANSETRON PER 1 MG: Performed by: SURGERY

## 2020-12-21 PROCEDURE — 83690 ASSAY OF LIPASE: CPT | Performed by: EMERGENCY MEDICINE

## 2020-12-21 PROCEDURE — 25010000002 CEFEPIME PER 500 MG: Performed by: FAMILY MEDICINE

## 2020-12-21 PROCEDURE — 25010000002 CEFEPIME PER 500 MG: Performed by: SURGERY

## 2020-12-21 PROCEDURE — 25010000002 ONDANSETRON PER 1 MG: Performed by: FAMILY MEDICINE

## 2020-12-21 PROCEDURE — 80074 ACUTE HEPATITIS PANEL: CPT | Performed by: EMERGENCY MEDICINE

## 2020-12-21 PROCEDURE — 96374 THER/PROPH/DIAG INJ IV PUSH: CPT

## 2020-12-21 PROCEDURE — 25010000002 NEOSTIGMINE 10 MG/10ML SOLUTION: Performed by: NURSE ANESTHETIST, CERTIFIED REGISTERED

## 2020-12-21 PROCEDURE — 47562 LAPAROSCOPIC CHOLECYSTECTOMY: CPT | Performed by: SURGERY

## 2020-12-21 PROCEDURE — 25010000002 CEFEPIME 2 G/NS 100 ML SOLUTION: Performed by: FAMILY MEDICINE

## 2020-12-21 PROCEDURE — 99219 PR INITIAL OBSERVATION CARE/DAY 50 MINUTES: CPT | Performed by: FAMILY MEDICINE

## 2020-12-21 PROCEDURE — 74181 MRI ABDOMEN W/O CONTRAST: CPT

## 2020-12-21 PROCEDURE — 25010000002 HYDROMORPHONE PER 4 MG: Performed by: EMERGENCY MEDICINE

## 2020-12-21 PROCEDURE — 99284 EMERGENCY DEPT VISIT MOD MDM: CPT

## 2020-12-21 PROCEDURE — 25010000002 CEFEPIME 2 G/NS 100 ML SOLUTION: Performed by: SURGERY

## 2020-12-21 PROCEDURE — 85025 COMPLETE CBC W/AUTO DIFF WBC: CPT | Performed by: EMERGENCY MEDICINE

## 2020-12-21 PROCEDURE — 25010000002 ONDANSETRON PER 1 MG: Performed by: EMERGENCY MEDICINE

## 2020-12-21 PROCEDURE — 25010000002 KETOROLAC TROMETHAMINE PER 15 MG: Performed by: NURSE ANESTHETIST, CERTIFIED REGISTERED

## 2020-12-21 PROCEDURE — 81001 URINALYSIS AUTO W/SCOPE: CPT | Performed by: EMERGENCY MEDICINE

## 2020-12-21 PROCEDURE — 87636 SARSCOV2 & INF A&B AMP PRB: CPT | Performed by: FAMILY MEDICINE

## 2020-12-21 PROCEDURE — 88304 TISSUE EXAM BY PATHOLOGIST: CPT | Performed by: SURGERY

## 2020-12-21 PROCEDURE — 25010000002 HYDROMORPHONE PER 4 MG: Performed by: SURGERY

## 2020-12-21 PROCEDURE — 87086 URINE CULTURE/COLONY COUNT: CPT | Performed by: FAMILY MEDICINE

## 2020-12-21 RX ORDER — BUPIVACAINE HYDROCHLORIDE AND EPINEPHRINE 5; 5 MG/ML; UG/ML
INJECTION, SOLUTION EPIDURAL; INTRACAUDAL; PERINEURAL AS NEEDED
Status: DISCONTINUED | OUTPATIENT
Start: 2020-12-21 | End: 2020-12-21 | Stop reason: HOSPADM

## 2020-12-21 RX ORDER — ONDANSETRON 2 MG/ML
4 INJECTION INTRAMUSCULAR; INTRAVENOUS EVERY 6 HOURS PRN
Status: DISCONTINUED | OUTPATIENT
Start: 2020-12-21 | End: 2020-12-22 | Stop reason: HOSPADM

## 2020-12-21 RX ORDER — MIDAZOLAM HYDROCHLORIDE 1 MG/ML
1 INJECTION INTRAMUSCULAR; INTRAVENOUS
Status: DISCONTINUED | OUTPATIENT
Start: 2020-12-21 | End: 2020-12-21 | Stop reason: HOSPADM

## 2020-12-21 RX ORDER — SODIUM CHLORIDE 9 MG/ML
INJECTION, SOLUTION INTRAVENOUS AS NEEDED
Status: DISCONTINUED | OUTPATIENT
Start: 2020-12-21 | End: 2020-12-21 | Stop reason: HOSPADM

## 2020-12-21 RX ORDER — ONDANSETRON 4 MG/1
4 TABLET, FILM COATED ORAL EVERY 6 HOURS PRN
Status: DISCONTINUED | OUTPATIENT
Start: 2020-12-22 | End: 2020-12-22 | Stop reason: HOSPADM

## 2020-12-21 RX ORDER — LIDOCAINE HYDROCHLORIDE 10 MG/ML
INJECTION, SOLUTION EPIDURAL; INFILTRATION; INTRACAUDAL; PERINEURAL AS NEEDED
Status: DISCONTINUED | OUTPATIENT
Start: 2020-12-21 | End: 2020-12-21 | Stop reason: SURG

## 2020-12-21 RX ORDER — SODIUM CHLORIDE 0.9 % (FLUSH) 0.9 %
1-10 SYRINGE (ML) INJECTION AS NEEDED
Status: DISCONTINUED | OUTPATIENT
Start: 2020-12-21 | End: 2020-12-22 | Stop reason: HOSPADM

## 2020-12-21 RX ORDER — DEXAMETHASONE SODIUM PHOSPHATE 4 MG/ML
INJECTION, SOLUTION INTRA-ARTICULAR; INTRALESIONAL; INTRAMUSCULAR; INTRAVENOUS; SOFT TISSUE AS NEEDED
Status: DISCONTINUED | OUTPATIENT
Start: 2020-12-21 | End: 2020-12-21 | Stop reason: SURG

## 2020-12-21 RX ORDER — FAMOTIDINE 10 MG/ML
20 INJECTION, SOLUTION INTRAVENOUS
Status: COMPLETED | OUTPATIENT
Start: 2020-12-21 | End: 2020-12-21

## 2020-12-21 RX ORDER — SODIUM CHLORIDE AND POTASSIUM CHLORIDE 150; 450 MG/100ML; MG/100ML
100 INJECTION, SOLUTION INTRAVENOUS CONTINUOUS
Status: DISCONTINUED | OUTPATIENT
Start: 2020-12-22 | End: 2020-12-22 | Stop reason: HOSPADM

## 2020-12-21 RX ORDER — SODIUM CHLORIDE 0.9 % (FLUSH) 0.9 %
10 SYRINGE (ML) INJECTION EVERY 12 HOURS SCHEDULED
Status: DISCONTINUED | OUTPATIENT
Start: 2020-12-21 | End: 2020-12-21 | Stop reason: HOSPADM

## 2020-12-21 RX ORDER — ACETAMINOPHEN 160 MG/5ML
650 SOLUTION ORAL EVERY 4 HOURS PRN
Status: DISCONTINUED | OUTPATIENT
Start: 2020-12-21 | End: 2020-12-22 | Stop reason: HOSPADM

## 2020-12-21 RX ORDER — LABETALOL HYDROCHLORIDE 5 MG/ML
10 INJECTION, SOLUTION INTRAVENOUS
Status: DISCONTINUED | OUTPATIENT
Start: 2020-12-21 | End: 2020-12-22 | Stop reason: HOSPADM

## 2020-12-21 RX ORDER — SCOLOPAMINE TRANSDERMAL SYSTEM 1 MG/1
1 PATCH, EXTENDED RELEASE TRANSDERMAL ONCE
Status: DISCONTINUED | OUTPATIENT
Start: 2020-12-21 | End: 2020-12-22 | Stop reason: HOSPADM

## 2020-12-21 RX ORDER — MORPHINE SULFATE 2 MG/ML
2 INJECTION, SOLUTION INTRAMUSCULAR; INTRAVENOUS EVERY 4 HOURS PRN
Status: DISCONTINUED | OUTPATIENT
Start: 2020-12-21 | End: 2020-12-22 | Stop reason: HOSPADM

## 2020-12-21 RX ORDER — FENTANYL CITRATE 50 UG/ML
INJECTION, SOLUTION INTRAMUSCULAR; INTRAVENOUS AS NEEDED
Status: DISCONTINUED | OUTPATIENT
Start: 2020-12-21 | End: 2020-12-21 | Stop reason: SURG

## 2020-12-21 RX ORDER — SODIUM CHLORIDE, SODIUM LACTATE, POTASSIUM CHLORIDE, CALCIUM CHLORIDE 600; 310; 30; 20 MG/100ML; MG/100ML; MG/100ML; MG/100ML
9 INJECTION, SOLUTION INTRAVENOUS CONTINUOUS PRN
Status: DISCONTINUED | OUTPATIENT
Start: 2020-12-21 | End: 2020-12-22 | Stop reason: HOSPADM

## 2020-12-21 RX ORDER — ACETAMINOPHEN 325 MG/1
650 TABLET ORAL EVERY 4 HOURS PRN
Status: DISCONTINUED | OUTPATIENT
Start: 2020-12-21 | End: 2020-12-22 | Stop reason: HOSPADM

## 2020-12-21 RX ORDER — SODIUM CHLORIDE 9 MG/ML
10 INJECTION INTRAVENOUS AS NEEDED
Status: DISCONTINUED | OUTPATIENT
Start: 2020-12-21 | End: 2020-12-22 | Stop reason: HOSPADM

## 2020-12-21 RX ORDER — ACETAMINOPHEN 650 MG/1
650 SUPPOSITORY RECTAL EVERY 4 HOURS PRN
Status: DISCONTINUED | OUTPATIENT
Start: 2020-12-21 | End: 2020-12-22 | Stop reason: HOSPADM

## 2020-12-21 RX ORDER — SODIUM CHLORIDE 0.9 % (FLUSH) 0.9 %
10 SYRINGE (ML) INJECTION AS NEEDED
Status: DISCONTINUED | OUTPATIENT
Start: 2020-12-21 | End: 2020-12-21 | Stop reason: HOSPADM

## 2020-12-21 RX ORDER — SODIUM CHLORIDE 9 MG/ML
100 INJECTION, SOLUTION INTRAVENOUS CONTINUOUS
Status: ACTIVE | OUTPATIENT
Start: 2020-12-21 | End: 2020-12-21

## 2020-12-21 RX ORDER — LIDOCAINE HYDROCHLORIDE 20 MG/ML
JELLY TOPICAL AS NEEDED
Status: DISCONTINUED | OUTPATIENT
Start: 2020-12-21 | End: 2020-12-21 | Stop reason: SURG

## 2020-12-21 RX ORDER — SODIUM CHLORIDE 0.9 % (FLUSH) 0.9 %
3 SYRINGE (ML) INJECTION EVERY 12 HOURS SCHEDULED
Status: DISCONTINUED | OUTPATIENT
Start: 2020-12-21 | End: 2020-12-22 | Stop reason: HOSPADM

## 2020-12-21 RX ORDER — ONDANSETRON 2 MG/ML
4 INJECTION INTRAMUSCULAR; INTRAVENOUS ONCE
Status: COMPLETED | OUTPATIENT
Start: 2020-12-21 | End: 2020-12-21

## 2020-12-21 RX ORDER — PROPOFOL 10 MG/ML
VIAL (ML) INTRAVENOUS AS NEEDED
Status: DISCONTINUED | OUTPATIENT
Start: 2020-12-21 | End: 2020-12-21 | Stop reason: SURG

## 2020-12-21 RX ORDER — ONDANSETRON 2 MG/ML
INJECTION INTRAMUSCULAR; INTRAVENOUS AS NEEDED
Status: DISCONTINUED | OUTPATIENT
Start: 2020-12-21 | End: 2020-12-21 | Stop reason: SURG

## 2020-12-21 RX ORDER — HYDROMORPHONE HYDROCHLORIDE 1 MG/ML
0.5 INJECTION, SOLUTION INTRAMUSCULAR; INTRAVENOUS; SUBCUTANEOUS
Status: DISCONTINUED | OUTPATIENT
Start: 2020-12-21 | End: 2020-12-21 | Stop reason: HOSPADM

## 2020-12-21 RX ORDER — SODIUM CHLORIDE 0.9 % (FLUSH) 0.9 %
10 SYRINGE (ML) INJECTION EVERY 12 HOURS SCHEDULED
Status: DISCONTINUED | OUTPATIENT
Start: 2020-12-21 | End: 2020-12-22 | Stop reason: HOSPADM

## 2020-12-21 RX ORDER — MAGNESIUM HYDROXIDE 1200 MG/15ML
LIQUID ORAL AS NEEDED
Status: DISCONTINUED | OUTPATIENT
Start: 2020-12-21 | End: 2020-12-21 | Stop reason: HOSPADM

## 2020-12-21 RX ORDER — SODIUM CHLORIDE 0.9 % (FLUSH) 0.9 %
10 SYRINGE (ML) INJECTION AS NEEDED
Status: DISCONTINUED | OUTPATIENT
Start: 2020-12-21 | End: 2020-12-22 | Stop reason: HOSPADM

## 2020-12-21 RX ORDER — HYDROMORPHONE HYDROCHLORIDE 1 MG/ML
0.5 INJECTION, SOLUTION INTRAMUSCULAR; INTRAVENOUS; SUBCUTANEOUS
Status: DISCONTINUED | OUTPATIENT
Start: 2020-12-21 | End: 2020-12-22 | Stop reason: HOSPADM

## 2020-12-21 RX ORDER — OXYCODONE HYDROCHLORIDE 5 MG/1
5 TABLET ORAL EVERY 4 HOURS PRN
Status: DISCONTINUED | OUTPATIENT
Start: 2020-12-21 | End: 2020-12-22 | Stop reason: HOSPADM

## 2020-12-21 RX ORDER — FENTANYL CITRATE 50 UG/ML
50 INJECTION, SOLUTION INTRAMUSCULAR; INTRAVENOUS
Status: DISCONTINUED | OUTPATIENT
Start: 2020-12-21 | End: 2020-12-21 | Stop reason: HOSPADM

## 2020-12-21 RX ORDER — ONDANSETRON 2 MG/ML
4 INJECTION INTRAMUSCULAR; INTRAVENOUS ONCE AS NEEDED
Status: COMPLETED | OUTPATIENT
Start: 2020-12-21 | End: 2020-12-21

## 2020-12-21 RX ORDER — ROCURONIUM BROMIDE 10 MG/ML
INJECTION, SOLUTION INTRAVENOUS AS NEEDED
Status: DISCONTINUED | OUTPATIENT
Start: 2020-12-21 | End: 2020-12-21 | Stop reason: SURG

## 2020-12-21 RX ORDER — GLYCOPYRROLATE 0.2 MG/ML
INJECTION INTRAMUSCULAR; INTRAVENOUS AS NEEDED
Status: DISCONTINUED | OUTPATIENT
Start: 2020-12-21 | End: 2020-12-21 | Stop reason: SURG

## 2020-12-21 RX ORDER — NALOXONE HCL 0.4 MG/ML
0.1 VIAL (ML) INJECTION
Status: DISCONTINUED | OUTPATIENT
Start: 2020-12-21 | End: 2020-12-22 | Stop reason: HOSPADM

## 2020-12-21 RX ORDER — ONDANSETRON 2 MG/ML
4 INJECTION INTRAMUSCULAR; INTRAVENOUS EVERY 6 HOURS
Status: COMPLETED | OUTPATIENT
Start: 2020-12-21 | End: 2020-12-22

## 2020-12-21 RX ORDER — KETOROLAC TROMETHAMINE 30 MG/ML
INJECTION, SOLUTION INTRAMUSCULAR; INTRAVENOUS AS NEEDED
Status: DISCONTINUED | OUTPATIENT
Start: 2020-12-21 | End: 2020-12-21 | Stop reason: SURG

## 2020-12-21 RX ORDER — SIMETHICONE 80 MG
80 TABLET,CHEWABLE ORAL 4 TIMES DAILY PRN
Status: DISCONTINUED | OUTPATIENT
Start: 2020-12-21 | End: 2020-12-22 | Stop reason: HOSPADM

## 2020-12-21 RX ORDER — ACETAMINOPHEN 500 MG
1000 TABLET ORAL EVERY 12 HOURS
Status: DISCONTINUED | OUTPATIENT
Start: 2020-12-21 | End: 2020-12-22 | Stop reason: HOSPADM

## 2020-12-21 RX ORDER — SODIUM CHLORIDE, SODIUM LACTATE, POTASSIUM CHLORIDE, CALCIUM CHLORIDE 600; 310; 30; 20 MG/100ML; MG/100ML; MG/100ML; MG/100ML
150 INJECTION, SOLUTION INTRAVENOUS CONTINUOUS
Status: DISCONTINUED | OUTPATIENT
Start: 2020-12-21 | End: 2020-12-22 | Stop reason: HOSPADM

## 2020-12-21 RX ORDER — HYDROMORPHONE HYDROCHLORIDE 1 MG/ML
0.5 INJECTION, SOLUTION INTRAMUSCULAR; INTRAVENOUS; SUBCUTANEOUS ONCE
Status: COMPLETED | OUTPATIENT
Start: 2020-12-21 | End: 2020-12-21

## 2020-12-21 RX ORDER — NEOSTIGMINE METHYLSULFATE 1 MG/ML
INJECTION, SOLUTION INTRAVENOUS AS NEEDED
Status: DISCONTINUED | OUTPATIENT
Start: 2020-12-21 | End: 2020-12-21 | Stop reason: SURG

## 2020-12-21 RX ORDER — NALOXONE HCL 0.4 MG/ML
0.4 VIAL (ML) INJECTION
Status: DISCONTINUED | OUTPATIENT
Start: 2020-12-21 | End: 2020-12-22 | Stop reason: HOSPADM

## 2020-12-21 RX ADMIN — ROCURONIUM BROMIDE 10 MG: 10 INJECTION INTRAVENOUS at 15:26

## 2020-12-21 RX ADMIN — GLYCOPYRROLATE 0.1 MG: 0.2 INJECTION INTRAMUSCULAR; INTRAVENOUS at 15:21

## 2020-12-21 RX ADMIN — FENTANYL CITRATE 50 MCG: 50 INJECTION, SOLUTION INTRAMUSCULAR; INTRAVENOUS at 16:16

## 2020-12-21 RX ADMIN — GLYCOPYRROLATE 0.6 MG: 0.2 INJECTION INTRAMUSCULAR; INTRAVENOUS at 15:45

## 2020-12-21 RX ADMIN — PROPOFOL 200 MG: 10 INJECTION, EMULSION INTRAVENOUS at 15:05

## 2020-12-21 RX ADMIN — SODIUM CHLORIDE, POTASSIUM CHLORIDE, SODIUM LACTATE AND CALCIUM CHLORIDE: 600; 310; 30; 20 INJECTION, SOLUTION INTRAVENOUS at 15:30

## 2020-12-21 RX ADMIN — METRONIDAZOLE 500 MG: 500 INJECTION, SOLUTION INTRAVENOUS at 23:17

## 2020-12-21 RX ADMIN — ACETAMINOPHEN 1000 MG: 500 TABLET ORAL at 18:08

## 2020-12-21 RX ADMIN — SCOPALAMINE 1 PATCH: 1 PATCH, EXTENDED RELEASE TRANSDERMAL at 14:19

## 2020-12-21 RX ADMIN — METRONIDAZOLE 500 MG: 500 INJECTION, SOLUTION INTRAVENOUS at 09:37

## 2020-12-21 RX ADMIN — CEFEPIME 2 G: 2 INJECTION, POWDER, FOR SOLUTION INTRAVENOUS at 08:31

## 2020-12-21 RX ADMIN — KETOROLAC TROMETHAMINE 30 MG: 30 INJECTION, SOLUTION INTRAMUSCULAR at 15:40

## 2020-12-21 RX ADMIN — ONDANSETRON 4 MG: 2 INJECTION INTRAMUSCULAR; INTRAVENOUS at 04:55

## 2020-12-21 RX ADMIN — ROCURONIUM BROMIDE 30 MG: 10 INJECTION INTRAVENOUS at 15:05

## 2020-12-21 RX ADMIN — FAMOTIDINE 20 MG: 10 INJECTION INTRAVENOUS at 14:10

## 2020-12-21 RX ADMIN — ONDANSETRON 4 MG: 2 INJECTION INTRAMUSCULAR; INTRAVENOUS at 02:38

## 2020-12-21 RX ADMIN — ONDANSETRON 4 MG: 2 INJECTION INTRAMUSCULAR; INTRAVENOUS at 16:20

## 2020-12-21 RX ADMIN — FENTANYL CITRATE 50 MCG: 50 INJECTION, SOLUTION INTRAMUSCULAR; INTRAVENOUS at 15:31

## 2020-12-21 RX ADMIN — CEFEPIME HYDROCHLORIDE 2 G: 2 INJECTION, POWDER, FOR SOLUTION INTRAVENOUS at 15:10

## 2020-12-21 RX ADMIN — ONDANSETRON 4 MG: 2 INJECTION INTRAMUSCULAR; INTRAVENOUS at 15:40

## 2020-12-21 RX ADMIN — PROPOFOL 25 MCG/KG/MIN: 10 INJECTION, EMULSION INTRAVENOUS at 15:15

## 2020-12-21 RX ADMIN — SODIUM CHLORIDE 500 ML: 9 INJECTION, SOLUTION INTRAVENOUS at 02:38

## 2020-12-21 RX ADMIN — SODIUM CHLORIDE 100 ML/HR: 9 INJECTION, SOLUTION INTRAVENOUS at 06:16

## 2020-12-21 RX ADMIN — ACETAMINOPHEN 650 MG: 325 TABLET, FILM COATED ORAL at 23:18

## 2020-12-21 RX ADMIN — FENTANYL CITRATE 50 MCG: 50 INJECTION, SOLUTION INTRAMUSCULAR; INTRAVENOUS at 15:05

## 2020-12-21 RX ADMIN — NEOSTIGMINE 3.5 MG: 1 INJECTION INTRAVENOUS at 15:45

## 2020-12-21 RX ADMIN — HYDROMORPHONE HYDROCHLORIDE 0.5 MG: 1 INJECTION, SOLUTION INTRAMUSCULAR; INTRAVENOUS; SUBCUTANEOUS at 18:07

## 2020-12-21 RX ADMIN — DEXAMETHASONE SODIUM PHOSPHATE 8 MG: 4 INJECTION, SOLUTION INTRAMUSCULAR; INTRAVENOUS at 15:10

## 2020-12-21 RX ADMIN — ONDANSETRON 4 MG: 2 INJECTION INTRAMUSCULAR; INTRAVENOUS at 17:34

## 2020-12-21 RX ADMIN — SODIUM CHLORIDE, POTASSIUM CHLORIDE, SODIUM LACTATE AND CALCIUM CHLORIDE: 600; 310; 30; 20 INJECTION, SOLUTION INTRAVENOUS at 15:01

## 2020-12-21 RX ADMIN — CEFEPIME HYDROCHLORIDE 2 G: 2 INJECTION, POWDER, FOR SOLUTION INTRAVENOUS at 23:17

## 2020-12-21 RX ADMIN — METRONIDAZOLE 500 MG: 500 INJECTION, SOLUTION INTRAVENOUS at 15:15

## 2020-12-21 RX ADMIN — GLYCOPYRROLATE 0.1 MG: 0.2 INJECTION INTRAMUSCULAR; INTRAVENOUS at 15:24

## 2020-12-21 RX ADMIN — HYDROMORPHONE HYDROCHLORIDE 0.5 MG: 1 INJECTION, SOLUTION INTRAMUSCULAR; INTRAVENOUS; SUBCUTANEOUS at 02:38

## 2020-12-21 RX ADMIN — LIDOCAINE HYDROCHLORIDE 50 MG: 10 INJECTION, SOLUTION EPIDURAL; INFILTRATION; INTRACAUDAL; PERINEURAL at 15:05

## 2020-12-21 RX ADMIN — ONDANSETRON 4 MG: 2 INJECTION INTRAMUSCULAR; INTRAVENOUS at 08:28

## 2020-12-21 RX ADMIN — LIDOCAINE HYDROCHLORIDE 2 ML: 20 JELLY TOPICAL at 15:07

## 2020-12-21 RX ADMIN — ONDANSETRON 4 MG: 2 INJECTION INTRAMUSCULAR; INTRAVENOUS at 23:17

## 2020-12-21 NOTE — PROGRESS NOTES
Discharge Planning Assessment  [facility]     Patient Name: Yadira Angela  MRN: [id number]  Today's Date: 12/21/2020    Admit Date: 12/21/2020    Discharge Needs Assessment     Row Name 12/21/20 1113       Living Environment    Lives With  spouse;child(lance), dependent    Name(s) of Who Lives With Patient  Owen ( spoouse) [phone]    Current Living Arrangements  home/apartment/condo    Primary Care Provided by  self    Provides Primary Care For  child(lance)    Family Caregiver if Needed  spouse    Quality of Family Relationships  helpful;supportive    Able to Return to Prior Arrangements  yes       Resource/Environmental Concerns    Resource/Environmental Concerns  none    Transportation Concerns  car, none       Transition Planning    Patient/Family Anticipates Transition to  home with family    Patient/Family Anticipated Services at Transition  none    Transportation Anticipated  family or friend will provide       Discharge Needs Assessment    Readmission Within the Last 30 Days  no previous admission in last 30 days    Equipment Currently Used at Home  none    Concerns to be Addressed  no discharge needs identified    Anticipated Changes Related to Illness  inability to work    Equipment Needed After Discharge  none        Discharge Plan     Row Name 12/21/20 1118       Plan    Plan  Home    Plan Comments  I briefly met with Mrs Angela and  at bedside to initiate d/c planning. Her plan is to return home when medically ready. She is independent with all ADLs, working full time,etc. No d/c needs identified. Case mgt will follow as appropriate    Final Discharge Disposition Code  01 - home or self-care        Continued Care and Services - Admitted Since 12/21/2020    Coordination has not been started for this encounter.         Demographic Summary     Row Name 12/21/20 1111       General Information    Admission Type  inpatient    Arrived From  home    Referral Source  patient    Reason for Consult   discharge planning    Preferred Language  English    General Information Comments  PCP- Brittani Perdomo       Contact Information    Permission Granted to Share Info With  ;family/designee    Contact Information Comments  insurance- [employer]        Functional Status     Row Name 12/21/20 1112       Functional Status    Usual Activity Tolerance  good    Current Activity Tolerance  good       Functional Status, IADL    Medications  independent    Meal Preparation  independent    Housekeeping  independent    Laundry  independent    Shopping  independent       Mental Status    General Appearance WDL  WDL       Mental Status Summary    Recent Changes in Mental Status/Cognitive Functioning  no changes       Employment/    Employment Status  employed full-time    Current or Previous Occupation  service industry    Employment/ Comments  insurance- [employer]        Psychosocial    No documentation.       Abuse/Neglect    No documentation.       Legal    No documentation.       Substance Abuse    No documentation.       Patient Forms    No documentation.           Sonja C Kellerman, RN

## 2020-12-21 NOTE — PROGRESS NOTES
[facility] Medicine Services  PROGRESS NOTE    Patient Name: Yadira Angela  : 1995  MRN: [id number]    Date of Admission: 2020  Primary Care Physician: System, Provider Not In    Subjective   Subjective     CC:  RUQ pain    HPI:  No current pain, but intense pain last night. No nausea. Denies fever    ROS:  Gen- No fevers, chills  CV- No chest pain, palpitations  Resp- No cough, dyspnea  GI- No N/V/D, abd pain        Objective   Objective     Vital Signs:   Temp:  [97.3 °F (36.3 °C)-98.6 °F (37 °C)] 98.6 °F (37 °C)  Heart Rate:  [63-86] 85  Resp:  [18] 18  BP: (108-135)/(66-95) 135/81        Physical Exam:  Constitutional - no acute distress, nontoxic, in bed  HEENT-NCAT, mucous membranes moist  CV-RRR, S1 S2 normal, no m/r/g  Resp-CTAB, no wheezes, rhonchi or rales  Abd-soft, nontender, nondistended, normoactive bowel sounds  Ext-No lower extremity cyanosis, clubbing or edema bilaterally  Neuro-alert and oriented, speech clear, moves all extremities   Psych-normal affect   Skin- No rash on exposed UE or LE bilaterally      Results Reviewed:  Results from last 7 days   Lab Units 20  0230   WBC 10*3/mm3 4.62   HEMOGLOBIN g/dL 11.9*   HEMATOCRIT % 37.8   PLATELETS 10*3/mm3 255     Results from last 7 days   Lab Units 20  0231   SODIUM mmol/L 144   POTASSIUM mmol/L 3.7   CHLORIDE mmol/L 106   CO2 mmol/L 26.0   BUN mg/dL 17   CREATININE mg/dL 0.71   GLUCOSE mg/dL 121*   CALCIUM mg/dL 9.5   ALT (SGPT) U/L 445*   AST (SGOT) U/L 900*     Estimated Creatinine Clearance: 112.2 mL/min (by C-G formula based on SCr of 0.71 mg/dL).    Microbiology Results Abnormal     Procedure Component Value - Date/Time    COVID PRE-OP / PRE-PROCEDURE SCREENING ORDER (NO ISOLATION) - Swab, Nasopharynx [[id number]]  (Normal) Collected: 20 0500    Lab Status: Final result Specimen: Swab from Nasopharynx Updated: 20 0535    Narrative:      The following orders were created for  panel order COVID PRE-OP / PRE-PROCEDURE SCREENING ORDER (NO ISOLATION) - Swab, Nasopharynx.  Procedure                               Abnormality         Status                     ---------                               -----------         ------                     COVID-19 and FLU A/B PCR...[[id number]]  Normal              Final result                 Please view results for these tests on the individual orders.    COVID-19 and FLU A/B PCR - Swab, Nasopharynx [[id number]]  (Normal) Collected: 12/21/20 0500    Lab Status: Final result Specimen: Swab from Nasopharynx Updated: 12/21/20 0535     COVID19 Not Detected     Influenza A PCR Not Detected     Influenza B PCR Not Detected    Narrative:      Fact sheet for providers: https://www.fda.gov/media/061558/download    Fact sheet for patients: https://www.fda.gov/media/543932/download    Test performed by PCR.          Imaging Results (Last 24 Hours)     Procedure Component Value Units Date/Time    MRI abdomen wo contrast mrcp [[id number]] Collected: 12/21/20 0612     Updated: 12/21/20 0614    Narrative:      PROCEDURE: MRI MRCP WO    INDICATIONS: Acute RUQ abdominal pain, no prior imaging; Calculus of bile duct without cholangitis or cholecystitis without obstruction; Nausea with vomiting, unspecified; Calculus of bile duct without cholangitis or cholecystitis without obstruction    COMPARISON: No relevant comparison or correlation studies available at time of dictation.       TECHNIQUE:  Multiplanar MR imaging of the abdomen with dedicated MRCP 2-D and 3-D reconstructions. Study performed without IV contrast.contrast. Source and slab images are reviewed of the biliary tree.     FINDINGS: Examination tailored for biliary evaluation and all solid viscera are not included on all sequences. Moderate pericholecystic fluid and/or gallbladder wall thickening present with numerous gallstones. However no intra or extrahepatic biliary  dilatation present. Distal common bile  duct is less than 4 mm. No filling defects seen within the common bile duct. Grossly the kidneys are symmetric. No gross enlargement of the pancreas or peripancreatic fluid seen to indicate pancreatitis. Adrenal  glands and spleen are fairly homogeneous.      Impression:      Findings compatible with acute cholecystitis but no evidence of biliary obstruction or choledocholithiasis.                          Signer Name: Michaelle Skelton MD   Signed: 12/21/2020 6:12 AM   Workstation Name: [facility]    [facility]               I have reviewed the medications:  Scheduled Meds:cefepime, 2 g, Intravenous, Once  cefepime, 2 g, Intravenous, Q8H  metroNIDAZOLE, 500 mg, Intravenous, Q8H  sodium chloride, 10 mL, Intravenous, Q12H      Continuous Infusions:sodium chloride, 100 mL/hr, Last Rate: 100 mL/hr (12/21/20 0616)      PRN Meds:.•  acetaminophen **OR** acetaminophen **OR** acetaminophen  •  Morphine **AND** naloxone  •  ondansetron  •  Sodium Chloride (PF)  •  sodium chloride    Assessment/Plan   Assessment & Plan     Active Hospital Problems    Diagnosis  POA   • **Acute cholecystitis [K81.0]  Unknown   • Biliary colic [K80.50]  Yes   • Anemia [D64.9]  Yes   • Mood disorder (CMS/HCC) [F39]  Yes   • Elevated LFTs [R79.89]  Yes   • Elevated bilirubin [R17]  Yes   • Acute UTI (urinary tract infection) [N39.0]  Unknown      Resolved Hospital Problems   No resolved problems to display.        Brief Hospital Course to date:  Yadira Angela is a 25 y.o. female with history of anemia and depression presents with N/V and intermittent RUQ occurring with increasing frequency and intensity.    Acute cholecystitis  - MRCP shows GB shows stones, wall thickening and pericholecystic fluid, however CBD measures less than 4 mm.  Suspect elevated LFTs secondary to recently passed gallstone. Acute hepatitis panel negative.  - empiric antibiotics started (Penicillin allergy) -- patient currently breastfeeding,  "advised patient to \"pump and dump.\"  - pain and nausea control  - CMP and CBC am  - Discussed case with General Surgery Dr Roland  - NPO    DVT Prophylaxis:  Encourage mobilization      Disposition: I expect the patient to be discharged home in ~1 day.    CODE STATUS:   Code Status and Medical Interventions:   Ordered at: 12/21/20 0439     Code Status:    CPR     Medical Interventions (Level of Support Prior to Arrest):    Full       Balaji Plaza MD  12/21/20              "

## 2020-12-21 NOTE — PLAN OF CARE
Problem: Adult Inpatient Plan of Care  Goal: Absence of Hospital-Acquired Illness or Injury  Intervention: Prevent Infection  Recent Flowsheet Documentation  Taken 12/21/2020 0600 by Raul Cid, RN  Infection Prevention: rest/sleep promoted   Goal Outcome Evaluation:  Plan of Care Reviewed With: patient  Progress: no change

## 2020-12-21 NOTE — ANESTHESIA PREPROCEDURE EVALUATION
Anesthesia Evaluation     Patient summary reviewed and Nursing notes reviewed   history of anesthetic complications: PONV               Airway   Mallampati: I  TM distance: >3 FB  Neck ROM: full  No difficulty expected  Dental - normal exam     Pulmonary - negative pulmonary ROS and normal exam   (-) not a smoker  Cardiovascular - negative cardio ROS and normal exam        Neuro/Psych  (+) psychiatric history,     GI/Hepatic/Renal/Endo    (+)   liver disease history of elevated LFT, thyroid problem hypothyroidism    Musculoskeletal (-) negative ROS    Abdominal  - normal exam    Bowel sounds: normal.   Substance History - negative use     OB/GYN negative ob/gyn ROS         Other        ROS/Med Hx Other: 6/4/20 - repeat C section, now breastfeeding.  hgb 11.9 k 3.7.   presents with N/V and intermittent RUQ occurring with increasing frequency and intensity.                Anesthesia Plan    ASA 2     general     intravenous induction     Anesthetic plan, all risks, benefits, and alternatives have been provided, discussed and informed consent has been obtained with: patient.    Plan discussed with CRNA.

## 2020-12-21 NOTE — OP NOTE
OPERATIVE REPORT    DATE: 12/21/20    PATIENT: Yadira Angela     PREOPERATIVE DIAGNOSIS:    1. Acute cholecystitis      POSTOPERATIVE DIAGNOSIS:    1. Acute cholecystitis      PROCEDURES PERFORMED:  1. Laparoscopic cholecystectomy    SURGEON:  Eli Roland M.D.     ANESTHESIA:  General endotracheal.     ESTIMATED BLOOD LOSS:   minimal     FLUIDS:  Crystalloids.     SPECIMENS:  gallbladder      DRAINS:  None.     COUNTS:  Correct.     COMPLICATIONS:  None.     FINDINGS: Mildly inflamed gallbladder without palpable stones.     INDICATIONS:   Yadira Angela is a 25 y.o. year old female who presents with nausea/vomiting and right upper quadrant pain and was found to have acute cholecystitis.  The patient is here today for laparoscopic cholecystectomy.  The risks, benefits, and alternatives to the procedure were explained to the patient and they gave consent for surgery.        DESCRIPTION OF PROCEDURE:   After informed consent was taken, the patient was brought to the operating room and placed in supine position.  SCDs were placed. The patient underwent uneventful general endotracheal anesthesia.  She received mefoxin earlier today . The abdomen was prepped with ChloraPrep and draped in the usual sterile fashion. Timeout was performed.     Incision was made 15 cm below xiphoid and to the left, and the abdomen was entered using an 11 mm optiview trocar. The abdominal wall was very lax.  Diagnostic laparoscopy was performed to make sure no injury and none was observed.  Additional 5 mm ports were placed in the right upper quadrant and the subxiphoid area, and an 11 mm port was placed in the right abdomen.  The gallbladder was noted to be small, slightly inflamed.  The gallbladder was grasped and retracted up over the liver.  The neck of the gallbladder was grasped, and dissection was began.  The cystic duct and artery were skeletonized and the liver was seen behind them; the critical view of safety was  obtained.  Both structures were doubly clipped and cut.  The gallbladder was dissected off the liver bed with cautery.  The gallbladder was completely removed off the liver bed then placed in an Endo Catch bag and removed.  The gallbladder was sent for permanent specimen.  The right upper quadrant was copiously irrigated and suctioned until the effluent ran clear.  The liver bed and clips were examined and there was no bleeding or bile leakage.  The ports were each removed and replaced with no bleeding from port sites.  I closed the gallbladder extraction site with two interrupted 0-Vicryl sutures.  There was some mild bleeding that subsided when the second stitch was tied.  The abdomen was desufflated and the ports were removed.  The skin incisions were closed with 3-0 monocryl.  SureClose was placed and the incisions were infiltrated with 20 mL of local anesthesia.  The patient was awakened and taken to recovery in good condition.  Sponge and needles were counted and reported as correct.      Eli Roland MD

## 2020-12-21 NOTE — BRIEF OP NOTE
CHOLECYSTECTOMY LAPAROSCOPIC  Progress Note    Yadira Angela  12/21/2020    Pre-op Diagnosis:   acute cholecystitis       Post-Op Diagnosis Codes:     * Acute cholecystitis [K81.0]    Procedure/CPT® Codes:  LA LAP,CHOLECYSTECTOMY [80408]      Procedure(s):  CHOLECYSTECTOMY LAPAROSCOPIC    Surgeon(s):  Eli Roland MD    Anesthesia: General    Staff:   Circulator: Duyen Ortega RN  Scrub Person: Quan Vazquez; Faustino Garcia RN         Estimated Blood Loss: minimal    Urine Voided: * No values recorded between 12/21/2020  3:01 PM and 12/21/2020  3:58 PM *    Specimens:                Specimens     ID Source Type Tests Collected By Collected At Frozen?      A Gallbladder Tissue · TISSUE PATHOLOGY EXAM   Eli Roland MD 12/21/20 1533      Description: gallbladder and contents                Drains: * No LDAs found *    Findings: Mildly inflamed gallbladder without palpable stones.    Complications: None immediate          Eli Roland MD     Date: 12/21/2020  Time: 16:01 EST

## 2020-12-21 NOTE — PLAN OF CARE
PT arrived on the floor appx 0600 hours from ED. A&Ox4, mood/affect appropriate. Speech clear/logical. Lung sound clear bilaterally. Abd soft/non tender with positive BS. PT ambulates in room independently. PT denies pain/discomfort at this time. No overt deficit noted in gross motor mobility. VSS. SR on cardiac mx. Will cont to mx. Call light in reach.

## 2020-12-21 NOTE — ANESTHESIA POSTPROCEDURE EVALUATION
Patient: Yadira Angela    Procedure Summary     Date: 12/21/20 Room / Location:  [facility] /  [facility]    Anesthesia Start: 1501 Anesthesia Stop:     Procedure: CHOLECYSTECTOMY LAPAROSCOPIC (N/A Abdomen) Diagnosis:       Acute cholecystitis      (acute cholecystitis)    Surgeon: Eli Roland MD Provider: Bashir Ayon MD    Anesthesia Type: general ASA Status: 2          Anesthesia Type: general    Vitals  No vitals data found for the desired time range.          Post Anesthesia Care and Evaluation    Patient location during evaluation: PACU  Patient participation: complete - patient participated  Level of consciousness: awake and alert  Pain management: adequate  Airway patency: patent  Anesthetic complications: No anesthetic complications  PONV Status: none  Cardiovascular status: hemodynamically stable and acceptable  Respiratory status: nonlabored ventilation, acceptable and nasal cannula  Hydration status: acceptable

## 2020-12-21 NOTE — CONSULTS
GENERAL SURGERY CONSULTATION      Patient Care Team:  System, Provider Not In as PCP - General      Chief complaint: RUQ pain    Subjective     History of Present Illness    Yadira Angela is a 25 y.o. year old female with pmhx of anemia, depression, and 6 months post-partum presents with a few weeks of intermittent RUQ pain radiating around right flank that worsened acutely overnight.  +Associated n/v, symptom are not necessarily post prandial.  Denies fever.    Workup revealed elevated ALT/AST, Tbili 1.3, alk phos 171.  Bedside ultrasound in ED showed gallstones and sludge, ED physician unable to visualized CBD.  followup MRCP showed acute cholecystitis, but no evidence of biliary obstruction or choledocholithiasis.  No leukocytosis.  COVID screen negative.        Review of Systems   Gastrointestinal: Positive for abdominal pain, nausea and vomiting. Negative for abdominal distention, constipation and diarrhea.        Past Medical History:   Diagnosis Date   • Anxiety    • Disease of thyroid gland    • PUPP (pruritic urticarial papules and plaques of pregnancy)      Past Surgical History:   Procedure Laterality Date   •  SECTION N/A 2018    Procedure:  SECTION PRIMARY;  Surgeon: Akiko Miller MD;  Location:  [facility];  Service: Obstetrics   •  SECTION N/A 2020    Procedure:  SECTION REPEAT;  Surgeon: Akiko Miller MD;  Location:  [facility];  Service: Obstetrics;  Laterality: N/A;   • WISDOM TOOTH EXTRACTION       Family History   Problem Relation Age of Onset   • Hypertension Mother    • COPD Paternal Grandfather    • Breast cancer Paternal Grandmother    • Hypertension Maternal Grandmother    • COPD Maternal Grandmother    • Throat cancer Maternal Grandfather      Social History     Tobacco Use   • Smoking status: Never Smoker   • Smokeless tobacco: Never Used   Substance Use Topics   • Alcohol use: No   • Drug use: No     E-cigarette/Vaping         E-cigarette/Vaping Substances     Medications Prior to Admission   Medication Sig Dispense Refill Last Dose   • FLUoxetine (PROzac) 10 MG capsule Take 10 mg by mouth Daily.      • Pediatric Multivitamins-Fl (MULTI VIT/FL PO) Take  by mouth.      • Prenatal Vit-Fe Fumarate-FA (PRENATAL 27-1) 27-1 MG tablet tablet Take 1 tablet by mouth Daily.      • ferrous sulfate 325 (65 FE) MG tablet Take 1 tablet by mouth 2 (Two) Times a Day With Meals. 90 tablet 1    • ibuprofen (ADVIL,MOTRIN) 600 MG tablet Take 1 tablet by mouth Every 6 (Six) Hours As Needed for Mild Pain . 60 tablet 0      Allergies:  Amoxil [amoxicillin]    Objective      Vital Signs  Temp:  [97.3 °F (36.3 °C)-98.6 °F (37 °C)] 98.2 °F (36.8 °C)  Heart Rate:  [63-86] 74  Resp:  [18] 18  BP: (108-135)/(64-95) 123/76    Physical Exam  Constitutional:       General: She is not in acute distress.     Appearance: She is well-developed. She is not diaphoretic.   HENT:      Head: Normocephalic and atraumatic.      Mouth/Throat:      Pharynx: No oropharyngeal exudate.   Eyes:      Conjunctiva/sclera: Conjunctivae normal.      Pupils: Pupils are equal, round, and reactive to light.   Cardiovascular:      Rate and Rhythm: Normal rate and regular rhythm.   Pulmonary:      Effort: Pulmonary effort is normal. No respiratory distress.   Abdominal:      General: There is no distension.      Palpations: Abdomen is soft.      Comments: Very loose skin, lax abdominal wall.  C section incision.     Skin:     General: Skin is warm and dry.      Coloration: Skin is not pale.   Neurological:      Mental Status: She is alert and oriented to person, place, and time.      Cranial Nerves: No cranial nerve deficit.   Psychiatric:         Behavior: Behavior normal.         Thought Content: Thought content normal.         Results Review:   I reviewed the patient's new clinical results.  I reviewed the patient's new imaging results and agree with the interpretation.    Lab Results  (last 24 hours)     Procedure Component Value Units Date/Time    Urine Culture - Urine, Urine, Clean Catch [[id number]] Collected: 12/21/20 0422    Specimen: Urine, Clean Catch Updated: 12/21/20 0705    COVID PRE-OP / PRE-PROCEDURE SCREENING ORDER (NO ISOLATION) - Swab, Nasopharynx [[id number]]  (Normal) Collected: 12/21/20 0500    Specimen: Swab from Nasopharynx Updated: 12/21/20 0535    Narrative:      The following orders were created for panel order COVID PRE-OP / PRE-PROCEDURE SCREENING ORDER (NO ISOLATION) - Swab, Nasopharynx.  Procedure                               Abnormality         Status                     ---------                               -----------         ------                     COVID-19 and FLU A/B PCR...[[id number]]  Normal              Final result                 Please view results for these tests on the individual orders.    COVID-19 and FLU A/B PCR - Swab, Nasopharynx [[id number]]  (Normal) Collected: 12/21/20 0500    Specimen: Swab from Nasopharynx Updated: 12/21/20 0535     COVID19 Not Detected     Influenza A PCR Not Detected     Influenza B PCR Not Detected    Narrative:      Fact sheet for providers: https://www.fda.gov/media/247486/download    Fact sheet for patients: https://www.fda.gov/media/355019/download    Test performed by PCR.    Urinalysis, Microscopic Only - Urine, Clean Catch [[id number]]  (Abnormal) Collected: 12/21/20 0422    Specimen: Urine, Clean Catch Updated: 12/21/20 0508     RBC, UA 13-20 /HPF      WBC, UA 0-2 /HPF      Bacteria, UA None Seen /HPF      Squamous Epithelial Cells, UA 3-6 /HPF      Methodology Manual Light Microscopy    Urinalysis With Microscopic If Indicated (No Culture) - Urine, Clean Catch [[id number]]  (Abnormal) Collected: 12/21/20 0422    Specimen: Urine, Clean Catch Updated: 12/21/20 0500     Color, UA Dark Yellow     Appearance, UA Turbid     pH, UA 7.5     Specific Gravity, UA 1.026     Glucose, UA Negative     Ketones, UA 15 mg/dL (1+)      Bilirubin, UA Negative     Blood, UA Negative     Protein, UA Trace     Leuk Esterase, UA Small (1+)     Nitrite, UA Negative     Urobilinogen, UA 1.0 E.U./dL    Hepatitis Panel, Acute [[id number]]  (Normal) Collected: 12/21/20 0230    Specimen: Blood Updated: 12/21/20 0450     Hepatitis B Surface Ag Non-Reactive     Hep A IgM Non-Reactive     Hep B C IgM Non-Reactive     Hepatitis C Ab Non-Reactive    Narrative:      Results may be falsely decreased if patient taking Biotin.     Acetaminophen Level [[id number]]  (Normal) Collected: 12/21/20 0231    Specimen: Blood Updated: 12/21/20 0444     Acetaminophen <5.0 mcg/mL     POCT Pregnancy, Urine [[id number]]  (Normal) Collected: 12/21/20 0424    Specimen: Urine Updated: 12/21/20 0424     HCG, Urine, QL Negative     Lot Number [id number]     Internal Positive Control Positive     Internal Negative Control Negative    [place] Draw [[id number]] Collected: 12/21/20 0230    Specimen: Blood Updated: 12/21/20 0345    Narrative:      The following orders were created for panel order [place] Draw.  Procedure                               Abnormality         Status                     ---------                               -----------         ------                     Light Blue Top[[id number]]                                   Final result               Green Top (Gel)[[id number]]                                  Final result               Lavender Top[[id number]]                                     Final result               Gold Top - SST[[id number]]                                   Final result                 Please view results for these tests on the individual orders.    Light Blue Top [[id number]] Collected: 12/21/20 0230    Specimen: Blood Updated: 12/21/20 0345     Extra Tube hold for add-on     Comment: Auto resulted       Green Top (Gel) [[id number]] Collected: 12/21/20 0231    Specimen: Blood Updated: 12/21/20 0345     Extra Tube Hold for add-ons.     Comment: Auto  resulted.       Lavender Top [[id number]] Collected: 12/21/20 0230    Specimen: Blood Updated: 12/21/20 0345     Extra Tube hold for add-on     Comment: Auto resulted       Gold Top - SST [[id number]] Collected: 12/21/20 0230    Specimen: Blood Updated: 12/21/20 0345     Extra Tube Hold for add-ons.     Comment: Auto resulted.       Comprehensive Metabolic Panel [[id number]]  (Abnormal) Collected: 12/21/20 0231    Specimen: Blood Updated: 12/21/20 0326     Glucose 121 mg/dL      BUN 17 mg/dL      Creatinine 0.71 mg/dL      Sodium 144 mmol/L      Potassium 3.7 mmol/L      Chloride 106 mmol/L      CO2 26.0 mmol/L      Calcium 9.5 mg/dL      Total Protein 7.6 g/dL      Albumin 4.70 g/dL      ALT (SGPT) 445 U/L      AST (SGOT) 900 U/L      Alkaline Phosphatase 171 U/L      Total Bilirubin 1.3 mg/dL      eGFR Non African Amer 99 mL/min/1.73      Globulin 2.9 gm/dL      A/G Ratio 1.6 g/dL      BUN/Creatinine Ratio 23.9     Anion Gap 12.0 mmol/L     Narrative:      GFR Normal >60  Chronic Kidney Disease <60  Kidney Failure <15      Lipase [[id number]]  (Normal) Collected: 12/21/20 0231    Specimen: Blood Updated: 12/21/20 0317     Lipase 25 U/L     CBC & Differential [[id number]]  (Abnormal) Collected: 12/21/20 0230    Specimen: Blood Updated: 12/21/20 0248    Narrative:      The following orders were created for panel order CBC & Differential.  Procedure                               Abnormality         Status                     ---------                               -----------         ------                     CBC Auto Differential[[id number]]        Abnormal            Final result                 Please view results for these tests on the individual orders.    CBC Auto Differential [[id number]]  (Abnormal) Collected: 12/21/20 0230    Specimen: Blood Updated: 12/21/20 0248     WBC 4.62 10*3/mm3      RBC 4.57 10*6/mm3      Hemoglobin 11.9 g/dL      Hematocrit 37.8 %      MCV 82.7 fL      MCH 26.0 pg      MCHC 31.5 g/dL       RDW 13.5 %      RDW-SD 40.5 fl      MPV 9.7 fL      Platelets 255 10*3/mm3      Neutrophil % 69.6 %      Lymphocyte % 20.6 %      Monocyte % 8.4 %      Eosinophil % 0.6 %      Basophil % 0.6 %      Immature Grans % 0.2 %      Neutrophils, Absolute 3.21 10*3/mm3      Lymphocytes, Absolute 0.95 10*3/mm3      Monocytes, Absolute 0.39 10*3/mm3      Eosinophils, Absolute 0.03 10*3/mm3      Basophils, Absolute 0.03 10*3/mm3      Immature Grans, Absolute 0.01 10*3/mm3      nRBC 0.0 /100 WBC           Imaging Results (Last 24 Hours)     Procedure Component Value Units Date/Time    MRI abdomen wo contrast mrcp [[id number]] Collected: 12/21/20 0612     Updated: 12/21/20 0614    Narrative:      PROCEDURE: MRI MRCP WO    INDICATIONS: Acute RUQ abdominal pain, no prior imaging; Calculus of bile duct without cholangitis or cholecystitis without obstruction; Nausea with vomiting, unspecified; Calculus of bile duct without cholangitis or cholecystitis without obstruction    COMPARISON: No relevant comparison or correlation studies available at time of dictation.       TECHNIQUE:  Multiplanar MR imaging of the abdomen with dedicated MRCP 2-D and 3-D reconstructions. Study performed without IV contrast.contrast. Source and slab images are reviewed of the biliary tree.     FINDINGS: Examination tailored for biliary evaluation and all solid viscera are not included on all sequences. Moderate pericholecystic fluid and/or gallbladder wall thickening present with numerous gallstones. However no intra or extrahepatic biliary  dilatation present. Distal common bile duct is less than 4 mm. No filling defects seen within the common bile duct. Grossly the kidneys are symmetric. No gross enlargement of the pancreas or peripancreatic fluid seen to indicate pancreatitis. Adrenal  glands and spleen are fairly homogeneous.      Impression:      Findings compatible with acute cholecystitis but no evidence of biliary obstruction or  choledocholithiasis.                          Signer Name: Michaelle Skelton MD   Signed: 12/21/2020 6:12 AM   Workstation Name: [facility]    [facility]            Assessment/Plan       Acute cholecystitis    Biliary colic    Anemia    Mood disorder (CMS/HCC)    Elevated LFTs    Elevated bilirubin    Acute UTI (urinary tract infection)      Assessment & Plan     The risks, benefits, and alternatives of laparoscopic cholecystectomy were discussed, including but not limited to anesthesia complications, heart attack, stroke, death, post-cholecystectomy diarrhea, infection, bleeding, bile leak, injury to surrounding structures, particularly the common bile duct. Pt understands and has given consent.    I discussed the patients findings and my recommendations with patient    Eli Roland MD  12/21/20  15:58 EST

## 2020-12-21 NOTE — H&P
[facility] Medicine Services  HISTORY AND PHYSICAL    Patient Name: Yadira Angela  : 1995  MRN: [id number]  Primary Care Physician: System, Provider Not In  Date of admission: 2020    Gladys Squires PA-C 20 4:10 AM EST     Subjective   Subjective     Chief Complaint:  RUQ pain x 3 weeks    HPI:  Yadira Angela is a 25 y.o. female with a medical hx significant for anemia and depression who presented to [facility] ED c/o RUQ pain off and on for 3 weeks. The patient reports increased frequency over the last week. She rates the pain a 7/10 with associated nausea and vomiting.  It radiates from the RUQ to the epigastric area.  She reports the episodes occur at night and usually last approximately 6 hours.  No association with food. She reports nothing seems to relieve the pain.  Some loose stools.  No fever or chills.  No cough, shortness of breath, was congestion or sore throat.  No chest pain or palpitations.  No urinary symptoms.  No loss of taste or smell.  No known exposure to Covid-19.  Surgical hx significant for  section 2020.  No use of tobacco, alcohol or drugs.  Patient lives at home with her  and children. While in the ED, vitals were all stable. Labs revealed , , TB 1.3. Bedside ultrasound revealed gallstones and sludge. She will be admitted to the hospitalist service for further medical management.     Current COVID Risks are:  [] Fever []  Cough [] Shortness of breath [] Fatigue [] Change in taste or smell    [] Exposure to COVID positive patient  [] High risk facility   [x]  NONE    Review of Systems   Constitutional: Negative for chills, diaphoresis, fatigue and fever.   HENT: Negative for congestion, sore throat and trouble swallowing.    Eyes: Negative for pain and visual disturbance.   Respiratory: Negative for cough, shortness of breath and wheezing.    Cardiovascular: Negative for chest pain, palpitations and leg swelling.    Gastrointestinal: Positive for abdominal pain (RUQ), diarrhea, nausea and vomiting. Negative for blood in stool and constipation.   Genitourinary: Negative for difficulty urinating and dysuria.   Musculoskeletal: Negative for back pain and gait problem.   Skin: Negative for rash and wound.   Neurological: Negative for dizziness, syncope, speech difficulty, weakness and headaches.   Hematological: Negative for adenopathy. Does not bruise/bleed easily.   Psychiatric/Behavioral: Negative for agitation and confusion.      All other systems reviewed and are negative.     Personal History     Past Medical History:   Diagnosis Date   • Anxiety    • Disease of thyroid gland    • PUPP (pruritic urticarial papules and plaques of pregnancy)        Past Surgical History:   Procedure Laterality Date   •  SECTION N/A 2018    Procedure:  SECTION PRIMARY;  Surgeon: Akiko Miller MD;  Location:  [facility];  Service: Obstetrics   •  SECTION N/A 2020    Procedure:  SECTION REPEAT;  Surgeon: Akiko Miller MD;  Location:  [facility];  Service: Obstetrics;  Laterality: N/A;   • WISDOM TOOTH EXTRACTION         Family History: family history includes Breast cancer in her paternal grandmother; COPD in her maternal grandmother and paternal grandfather; Hypertension in her maternal grandmother and mother; Throat cancer in her maternal grandfather. Otherwise pertinent FHx was reviewed and unremarkable.     Social History:  reports that she has never smoked. She has never used smokeless tobacco. She reports that she does not drink alcohol or use drugs.  Social History     Social History Narrative   • Not on file       Medications:  FLUoxetine, Prenatal 27-1, ferrous sulfate, and ibuprofen   No current facility-administered medications on file prior to encounter.      Current Outpatient Medications on File Prior to Encounter   Medication Sig Dispense Refill   • FLUoxetine  (PROzac) 10 MG capsule Take 10 mg by mouth Daily.     • Pediatric Multivitamins-Fl (MULTI VIT/FL PO) Take  by mouth.     • Prenatal Vit-Fe Fumarate-FA (PRENATAL 27-1) 27-1 MG tablet tablet Take 1 tablet by mouth Daily.     • ferrous sulfate 325 (65 FE) MG tablet Take 1 tablet by mouth 2 (Two) Times a Day With Meals. 90 tablet 1   • ibuprofen (ADVIL,MOTRIN) 600 MG tablet Take 1 tablet by mouth Every 6 (Six) Hours As Needed for Mild Pain . 60 tablet 0           Allergies   Allergen Reactions   • Amoxil [Amoxicillin] Rash       Objective   Objective     Vital Signs:   Temp:  [97.3 °F (36.3 °C)] 97.3 °F (36.3 °C)  Heart Rate:  [63-86] 86  Resp:  [18] 18  BP: (108-125)/(66-78) 108/66    Physical Exam   Constitutional: Awake, alert, sitting up in bed  Eyes: PERRLA, sclerae anicteric, no conjunctival injection  HENT: NCAT, mucous membranes moist  Neck: Supple, no thyromegaly, no lymphadenopathy, trachea midline  Respiratory: Clear to auscultation bilaterally, nonlabored respirations   Cardiovascular: RRR, no murmurs appreciated, palpable pedal pulses bilaterally  Gastrointestinal: Positive bowel sounds, soft, right upper quadrant tender to deep palpation, positive Pierson sign, no distention or guarding   Musculoskeletal: No bilateral ankle edema, no clubbing or cyanosis to extremities  Psychiatric: Appropriate affect, cooperative  Neurologic: Oriented x 3, strength symmetric in all extremities, Cranial Nerves grossly intact to confrontation, speech clear  Skin: No rashes or wounds    Results Reviewed:  I have personally reviewed most recent indicated data and agree with findings including:  [x]  Laboratory  [x]  Radiology  [x]  EKG/Telemetry  []  Pathology  []  Cardiac/Vascular Studies  []  Old records  []  Other:    Most pertinent findings include:  , , TB 1.3    LAB RESULTS:  Results from last 7 days   Lab 12/21/20  0230   WBC 4.62   HEMOGLOBIN 11.9*   HEMATOCRIT 37.8   PLATELETS 255   NEUTROS ABS 3.21      IMMATURE GRANS (ABS) 0.01   LYMPHS ABS 0.95   MONOS ABS 0.39   EOS ABS 0.03   MCV 82.7     Results from last 7 days   Lab 12/21/20  0231   SODIUM 144   POTASSIUM 3.7   CHLORIDE 106   CO2 26.0   ANION GAP 12.0   BUN 17   CREATININE 0.71   GLUCOSE 121*   CALCIUM 9.5     Results from last 7 days   Lab 12/21/20  0231   TOTAL PROTEIN 7.6   ALBUMIN 4.70   GLOBULIN 2.9   ALT (SGPT) 445*   AST (SGOT) 900*   BILIRUBIN 1.3*   ALK PHOS 171*   LIPASE 25                     Brief Urine Lab Results     None        Microbiology Results (last 10 days)     ** No results found for the last 240 hours. **          No radiology results from the last 24 hrs         Assessment/Plan   Assessment & Plan       Biliary colic    Anemia    Mood disorder (CMS/HCC)    Elevated LFTs    Elevated bilirubin    1. Biliary colic with suspicion of choledocholithiasis  2. Elevated LFTs, / Elevated bilirubin   --Bedside ultrasound in ED revealed gallstones and sludge, ED physician unable to visualize common bile duct  --Obtain MRCP   --Consult GI  --Morning labs   --Keep n.p.o.  --Supportive care   --IVF     3. Anemia  --Stable  --Continue iron supplement     4. Breastfeeding    5. Mood disorder  --Continue Prozac     DVT prophylaxis: SCDs    CODE STATUS:   Code Status and Medical Interventions:   Ordered at: 12/21/20 0439     Code Status:    CPR     Medical Interventions (Level of Support Prior to Arrest):    Full     Electronically signed by Gladys Squires PA-C, 12/21/20, 4:10 AM EST.      Attending   Admission Attestation       I have seen and examined the patient, performing an independent face-to-face diagnostic evaluation with plan of care reviewed and developed with the advanced practice clinician (APC).      Brief Summary Statement:   Yadira Angela is a 25 y.o. female with PMH of anemia, depression and hypothyroidism, that she reports has resolved. The patient presents to [facility] ED with complaint of right upper quadrant pain on and off for the  "past 3 weeks.  She states the pain has been associated with nausea and vomiting and at times is been 7 out of 10.  The pain radiates from the right upper quadrant to the epigastric region.  She states that at times it will last up to 6 hours.  She denies any diarrhea or constipation and states that she does not associate the pain with p.o. intake.  On arrival patient was noted to have gallstones and sludge on bedside ultrasound.  Labs noted an ALT of 445, AST of 900 and total bili Brandon of 1.3.  MRCP was ordered and noted \"acute cholecystitis but no evidence of biliary obstruction or choledocholithiasis.\" We will keep pt NPO and Consult Gen Surgery. And start Cefepime.     Remainder of detailed HPI is as noted by APC and has been reviewed and/or edited by me for completeness.    Attending Physical Exam:  Constitutional: No acute distress, awake, alert  HENT: NCAT, mucous membranes moist  Respiratory: Clear to auscultation bilaterally, respiratory effort normal   Cardiovascular: RRR, no murmurs, rubs, or gallops  Gastrointestinal: Positive bowel sounds, soft, mildly positive bowden's sign, nondistended  Musculoskeletal: No bilateral ankle edema  Psychiatric: Appropriate affect, cooperative  Neurologic: Oriented x 3, strength symmetric in all extremities, Cranial Nerves grossly intact to confrontation, speech clear  Skin: No rashes      Brief Assessment/Plan :  See detailed assessment and plan developed with APC which I have reviewed and/or edited for completeness.    Admission Status: I believe that this patient meets INPATIENT status due to elevated lfts and cholelithiasis with choledocholithiasis.  I feel patient’s risk for adverse outcomes and need for care warrant INPATIENT evaluation and I predict the patient’s care encounter to likely last beyond 2 midnights.        Renay Naylor, DO  12/21/20                "

## 2020-12-21 NOTE — ANESTHESIA PROCEDURE NOTES
Airway  Urgency: elective    Date/Time: 12/21/2020 3:07 PM  Airway not difficult    General Information and Staff    Patient location during procedure: OR  CRNA: Luis Morrison CRNA    Indications and Patient Condition  Indications for airway management: airway protection    Preoxygenated: yes  MILS not maintained throughout  Mask difficulty assessment: 1 - vent by mask    Final Airway Details  Final airway type: endotracheal airway      Successful airway: ETT  Cuffed: yes   Successful intubation technique: direct laryngoscopy  Endotracheal tube insertion site: oral  Blade: Leger  Blade size: 2  ETT size (mm): 6.5  Cormack-Lehane Classification: grade I - full view of glottis  Placement verified by: chest auscultation and capnometry   Cuff volume (mL): 5  Measured from: lips  ETT/EBT  to lips (cm): 20  Number of attempts at approach: 1  Assessment: lips, teeth, and gum same as pre-op and atraumatic intubation    Additional Comments  Negative epigastric sounds, Breath sound equal bilaterally with symmetric chest rise and fall

## 2020-12-21 NOTE — ED PROVIDER NOTES
Subjective   25-year-old female presents for evaluation of right upper quadrant abdominal pain and nausea/vomiting.  She states that over the past 6 days she has had 3 episodes of right upper quadrant pain accompanied by nausea and vomiting, all at nighttime.  She is unsure as to what might be triggering the episodes.  The pain will radiate from her right upper quadrant to her epigastrium at times.  Pain is currently 7 out of 10 in severity.  She denies any recent changes to her diet.  No fevers.  No chills.  No sick contacts.          Review of Systems   Gastrointestinal: Positive for abdominal pain, nausea and vomiting.   All other systems reviewed and are negative.      Past Medical History:   Diagnosis Date   • Anxiety    • Disease of thyroid gland    • PUPP (pruritic urticarial papules and plaques of pregnancy)        Allergies   Allergen Reactions   • Amoxil [Amoxicillin] Rash       Past Surgical History:   Procedure Laterality Date   •  SECTION N/A 2018    Procedure:  SECTION PRIMARY;  Surgeon: Akiko Miller MD;  Location:  [facility];  Service: Obstetrics   •  SECTION N/A 2020    Procedure:  SECTION REPEAT;  Surgeon: Akiko Miller MD;  Location:  [facility];  Service: Obstetrics;  Laterality: N/A;   • WISDOM TOOTH EXTRACTION         Family History   Problem Relation Age of Onset   • Hypertension Mother    • COPD Paternal Grandfather    • Breast cancer Paternal Grandmother    • Hypertension Maternal Grandmother    • COPD Maternal Grandmother    • Throat cancer Maternal Grandfather        Social History     Socioeconomic History   • Marital status:      Spouse name: Not on file   • Number of children: Not on file   • Years of education: Not on file   • Highest education level: Not on file   Tobacco Use   • Smoking status: Never Smoker   • Smokeless tobacco: Never Used   Substance and Sexual Activity   • Alcohol use: No   • Drug use: No    • Sexual activity: Defer     Partners: Male           Objective   Physical Exam  Vitals signs and nursing note reviewed.   Constitutional:       General: She is not in acute distress.     Appearance: She is well-developed. She is not diaphoretic.      Comments: Nontoxic-appearing female   HENT:      Head: Normocephalic and atraumatic.   Eyes:      Pupils: Pupils are equal, round, and reactive to light.   Neck:      Musculoskeletal: Normal range of motion and neck supple.   Cardiovascular:      Rate and Rhythm: Normal rate and regular rhythm.      Heart sounds: Normal heart sounds. No murmur. No friction rub. No gallop.    Pulmonary:      Effort: Pulmonary effort is normal. No respiratory distress.      Breath sounds: Normal breath sounds. No wheezing or rales.   Abdominal:      General: Bowel sounds are normal. There is no distension.      Palpations: Abdomen is soft. There is no mass.      Tenderness: There is abdominal tenderness. There is guarding. There is no rebound.      Comments: Focal right upper quadrant tenderness noted, voluntary guarding present, no pain out of proportion to exam, negative Pierson's sign   Genitourinary:     Comments: No CVA tenderness noted  Musculoskeletal: Normal range of motion.   Skin:     General: Skin is warm and dry.      Findings: No erythema or rash.   Neurological:      General: No focal deficit present.      Mental Status: She is alert and oriented to person, place, and time.   Psychiatric:         Mood and Affect: Mood normal.         Thought Content: Thought content normal.         Judgment: Judgment normal.         Abdominal Ultrasound    Date/Time: 12/21/2020 2:37 AM  Performed by: Joseph Vail MD  Authorized by: Joseph Vail MD     Procedure details:     Indications: abdominal pain      Assessment for:  Gallstones    Hepatobiliary:  Visualized  Hepatobiliary findings:     Common bile duct:  Unable to visualize    Gallbladder wall:  Normal    Gallbladder  stones: identified (and sludge)      Mass: not identified      Intra-abdominal fluid: not identified      Polyps: not identified      Sonographic Pierson's sign: negative                 ED Course  ED Course as of Dec 21 0640   Mon Dec 21, 2020   0236 25-year-old female presents for evaluation of right upper quadrant abdominal pain and nausea/vomiting intermittently over the past several days.  On arrival to the ED, the patient is nontoxic-appearing.  Exam remarkable for focal right upper quadrant abdominal tenderness.  Bedside ultrasound revealed visible gallstones and sludge but negative sonographic Pierson's sign, no pericholecystic fluid, and no gallbladder wall thickening.  We will obtain labs, and we will reassess following IV fluids and medications.    [DD]   0328 Labs remarkable for elevated LFTs, alk phos, and total bilirubin, concerning for potential choledocholithiasis.    [DD]   0330 I have ordered an MRCP, and the results are pending at this time.  We will seek admission to the hospitalist for gastroenterology consult this morning.  The patient is aware/agreeable with the plan at this time.    [DD]   0339 I discussed the patient case with Dr. Naylor, and the patient will be admitted under her care for further evaluation and treatment.  She is aware/agreeable with plan.    [DD]   0639 I reviewed the patient's MRCP findings which appear consistent with acute cholecystitis but no choledocholithiasis.  I have contacted Dr. Naylor so that patient can be given a dose of Zosyn this morning prior to her surgical consult.    [DD]      ED Course User Index  [DD] Joseph Vail MD                                 Recent Results (from the past 24 hour(s))   Light Blue Top    Collection Time: 12/21/20  2:30 AM   Result Value Ref Range    Extra Tube hold for add-on    Lavender Top    Collection Time: 12/21/20  2:30 AM   Result Value Ref Range    Extra Tube hold for add-on    Gold Top - SST    Collection Time:  12/21/20  2:30 AM   Result Value Ref Range    Extra Tube Hold for add-ons.    CBC Auto Differential    Collection Time: 12/21/20  2:30 AM    Specimen: Blood   Result Value Ref Range    WBC 4.62 3.40 - 10.80 10*3/mm3    RBC 4.57 3.77 - 5.28 10*6/mm3    Hemoglobin 11.9 (L) 12.0 - 15.9 g/dL    Hematocrit 37.8 34.0 - 46.6 %    MCV 82.7 79.0 - 97.0 fL    MCH 26.0 (L) 26.6 - 33.0 pg    MCHC 31.5 31.5 - 35.7 g/dL    RDW 13.5 12.3 - 15.4 %    RDW-SD 40.5 37.0 - 54.0 fl    MPV 9.7 6.0 - 12.0 fL    Platelets 255 140 - 450 10*3/mm3    Neutrophil % 69.6 42.7 - 76.0 %    Lymphocyte % 20.6 19.6 - 45.3 %    Monocyte % 8.4 5.0 - 12.0 %    Eosinophil % 0.6 0.3 - 6.2 %    Basophil % 0.6 0.0 - 1.5 %    Immature Grans % 0.2 0.0 - 0.5 %    Neutrophils, Absolute 3.21 1.70 - 7.00 10*3/mm3    Lymphocytes, Absolute 0.95 0.70 - 3.10 10*3/mm3    Monocytes, Absolute 0.39 0.10 - 0.90 10*3/mm3    Eosinophils, Absolute 0.03 0.00 - 0.40 10*3/mm3    Basophils, Absolute 0.03 0.00 - 0.20 10*3/mm3    Immature Grans, Absolute 0.01 0.00 - 0.05 10*3/mm3    nRBC 0.0 0.0 - 0.2 /100 WBC   Comprehensive Metabolic Panel    Collection Time: 12/21/20  2:31 AM    Specimen: Blood   Result Value Ref Range    Glucose 121 (H) 65 - 99 mg/dL    BUN 17 6 - 20 mg/dL    Creatinine 0.71 0.57 - 1.00 mg/dL    Sodium 144 136 - 145 mmol/L    Potassium 3.7 3.5 - 5.2 mmol/L    Chloride 106 98 - 107 mmol/L    CO2 26.0 22.0 - 29.0 mmol/L    Calcium 9.5 8.6 - 10.5 mg/dL    Total Protein 7.6 6.0 - 8.5 g/dL    Albumin 4.70 3.50 - 5.20 g/dL    ALT (SGPT) 445 (H) 1 - 33 U/L    AST (SGOT) 900 (H) 1 - 32 U/L    Alkaline Phosphatase 171 (H) 39 - 117 U/L    Total Bilirubin 1.3 (H) 0.0 - 1.2 mg/dL    eGFR Non African Amer 99 >60 mL/min/1.73    Globulin 2.9 gm/dL    A/G Ratio 1.6 g/dL    BUN/Creatinine Ratio 23.9 7.0 - 25.0    Anion Gap 12.0 5.0 - 15.0 mmol/L   Lipase    Collection Time: 12/21/20  2:31 AM    Specimen: Blood   Result Value Ref Range    Lipase 25 13 - 60 U/L   Green Top (Gel)  "   Collection Time: 12/21/20  2:31 AM   Result Value Ref Range    Extra Tube Hold for add-ons.    POCT Pregnancy, Urine    Collection Time: 12/21/20  4:24 AM    Specimen: Urine   Result Value Ref Range    HCG, Urine, QL Negative Negative    Lot Number [id number]     Internal Positive Control Positive     Internal Negative Control Negative      Note: In addition to lab results from this visit, the labs listed above may include labs taken at another facility or during a different encounter within the last 24 hours. Please correlate lab times with ED admission and discharge times for further clarification of the services performed during this visit.    MRI abdomen wo contrast mrcp    (Results Pending)     Vitals:    12/21/20 0213 12/21/20 0300 12/21/20 0330 12/21/20 0400   BP: 125/78 113/67 108/66 108/70   Pulse: 71 63 86 80   Resp: 18      Temp: 97.3 °F (36.3 °C)      TempSrc: Infrared      SpO2: 98% 98% 99% 98%   Weight: 67.1 kg (148 lb)      Height: 162.6 cm (64\")        Medications   Sodium Chloride (PF) 0.9 % 10 mL (has no administration in time range)   ondansetron (ZOFRAN) injection 4 mg (has no administration in time range)   sodium chloride 0.9 % bolus 500 mL (0 mL Intravenous Stopped 12/21/20 0321)   HYDROmorphone (DILAUDID) injection 0.5 mg (0.5 mg Intravenous Given 12/21/20 0238)   ondansetron (ZOFRAN) injection 4 mg (4 mg Intravenous Given 12/21/20 0238)     ECG/EMG Results (last 24 hours)     ** No results found for the last 24 hours. **        No orders to display         Recent Results (from the past 24 hour(s))   Light Blue Top    Collection Time: 12/21/20  2:30 AM   Result Value Ref Range    Extra Tube hold for add-on    Lavender Top    Collection Time: 12/21/20  2:30 AM   Result Value Ref Range    Extra Tube hold for add-on    Gold Top - SST    Collection Time: 12/21/20  2:30 AM   Result Value Ref Range    Extra Tube Hold for add-ons.    CBC Auto Differential    Collection Time: 12/21/20  2:30 AM    " Specimen: Blood   Result Value Ref Range    WBC 4.62 3.40 - 10.80 10*3/mm3    RBC 4.57 3.77 - 5.28 10*6/mm3    Hemoglobin 11.9 (L) 12.0 - 15.9 g/dL    Hematocrit 37.8 34.0 - 46.6 %    MCV 82.7 79.0 - 97.0 fL    MCH 26.0 (L) 26.6 - 33.0 pg    MCHC 31.5 31.5 - 35.7 g/dL    RDW 13.5 12.3 - 15.4 %    RDW-SD 40.5 37.0 - 54.0 fl    MPV 9.7 6.0 - 12.0 fL    Platelets 255 140 - 450 10*3/mm3    Neutrophil % 69.6 42.7 - 76.0 %    Lymphocyte % 20.6 19.6 - 45.3 %    Monocyte % 8.4 5.0 - 12.0 %    Eosinophil % 0.6 0.3 - 6.2 %    Basophil % 0.6 0.0 - 1.5 %    Immature Grans % 0.2 0.0 - 0.5 %    Neutrophils, Absolute 3.21 1.70 - 7.00 10*3/mm3    Lymphocytes, Absolute 0.95 0.70 - 3.10 10*3/mm3    Monocytes, Absolute 0.39 0.10 - 0.90 10*3/mm3    Eosinophils, Absolute 0.03 0.00 - 0.40 10*3/mm3    Basophils, Absolute 0.03 0.00 - 0.20 10*3/mm3    Immature Grans, Absolute 0.01 0.00 - 0.05 10*3/mm3    nRBC 0.0 0.0 - 0.2 /100 WBC   Hepatitis Panel, Acute    Collection Time: 12/21/20  2:30 AM    Specimen: Blood   Result Value Ref Range    Hepatitis B Surface Ag Non-Reactive Non-Reactive    Hep A IgM Non-Reactive Non-Reactive    Hep B C IgM Non-Reactive Non-Reactive    Hepatitis C Ab Non-Reactive Non-Reactive   Comprehensive Metabolic Panel    Collection Time: 12/21/20  2:31 AM    Specimen: Blood   Result Value Ref Range    Glucose 121 (H) 65 - 99 mg/dL    BUN 17 6 - 20 mg/dL    Creatinine 0.71 0.57 - 1.00 mg/dL    Sodium 144 136 - 145 mmol/L    Potassium 3.7 3.5 - 5.2 mmol/L    Chloride 106 98 - 107 mmol/L    CO2 26.0 22.0 - 29.0 mmol/L    Calcium 9.5 8.6 - 10.5 mg/dL    Total Protein 7.6 6.0 - 8.5 g/dL    Albumin 4.70 3.50 - 5.20 g/dL    ALT (SGPT) 445 (H) 1 - 33 U/L    AST (SGOT) 900 (H) 1 - 32 U/L    Alkaline Phosphatase 171 (H) 39 - 117 U/L    Total Bilirubin 1.3 (H) 0.0 - 1.2 mg/dL    eGFR Non African Amer 99 >60 mL/min/1.73    Globulin 2.9 gm/dL    A/G Ratio 1.6 g/dL    BUN/Creatinine Ratio 23.9 7.0 - 25.0    Anion Gap 12.0 5.0 -  15.0 mmol/L   Lipase    Collection Time: 12/21/20  2:31 AM    Specimen: Blood   Result Value Ref Range    Lipase 25 13 - 60 U/L   Green Top (Gel)    Collection Time: 12/21/20  2:31 AM   Result Value Ref Range    Extra Tube Hold for add-ons.    Acetaminophen Level    Collection Time: 12/21/20  2:31 AM    Specimen: Blood   Result Value Ref Range    Acetaminophen <5.0 0.0 - 30.0 mcg/mL   Urinalysis With Microscopic If Indicated (No Culture) - Urine, Clean Catch    Collection Time: 12/21/20  4:22 AM    Specimen: Urine, Clean Catch   Result Value Ref Range    Color, UA Dark Yellow (A) Yellow, Straw    Appearance, UA Turbid (A) Clear    pH, UA 7.5 5.0 - 8.0    Specific Gravity, UA 1.026 1.001 - 1.030    Glucose, UA Negative Negative    Ketones, UA 15 mg/dL (1+) (A) Negative    Bilirubin, UA Negative Negative    Blood, UA Negative Negative    Protein, UA Trace (A) Negative    Leuk Esterase, UA Small (1+) (A) Negative    Nitrite, UA Negative Negative    Urobilinogen, UA 1.0 E.U./dL 0.2 - 1.0 E.U./dL   Urinalysis, Microscopic Only - Urine, Clean Catch    Collection Time: 12/21/20  4:22 AM    Specimen: Urine, Clean Catch   Result Value Ref Range    RBC, UA 13-20 (A) None Seen, 0-2 /HPF    WBC, UA 0-2 None Seen, 0-2 /HPF    Bacteria, UA None Seen None Seen, Trace /HPF    Squamous Epithelial Cells, UA 3-6 (A) None Seen, 0-2 /HPF    Methodology Manual Light Microscopy    POCT Pregnancy, Urine    Collection Time: 12/21/20  4:24 AM    Specimen: Urine   Result Value Ref Range    HCG, Urine, QL Negative Negative    Lot Number [id number]     Internal Positive Control Positive     Internal Negative Control Negative    COVID-19 and FLU A/B PCR - Swab, Nasopharynx    Collection Time: 12/21/20  5:00 AM    Specimen: Nasopharynx; Swab   Result Value Ref Range    COVID19 Not Detected Not Detected - Ref. Range    Influenza A PCR Not Detected Not Detected    Influenza B PCR Not Detected Not Detected     Note: In addition to lab results from  this visit, the labs listed above may include labs taken at another facility or during a different encounter within the last 24 hours. Please correlate lab times with ED admission and discharge times for further clarification of the services performed during this visit.    MRI abdomen wo contrast mrcp   Final Result   Findings compatible with acute cholecystitis but no evidence of biliary obstruction or choledocholithiasis.                                      Signer Name: Michaelle Skelton MD    Signed: 12/21/2020 6:12 AM    Workstation Name: [email or web address]     [facility]        Vitals:    12/21/20 0330 12/21/20 0400 12/21/20 0430 12/21/20 0606   BP: 108/66 108/70 115/95 135/81   BP Location:    Right arm   Patient Position:    Sitting   Pulse: 86 80 67 85   Resp:    18   Temp:    98.6 °F (37 °C)   TempSrc:    Oral   SpO2: 99% 98% 98% 97%   Weight:       Height:         Medications   Sodium Chloride (PF) 0.9 % 10 mL (has no administration in time range)   sodium chloride 0.9 % flush 10 mL (has no administration in time range)   sodium chloride 0.9 % flush 10 mL (has no administration in time range)   acetaminophen (TYLENOL) tablet 650 mg (has no administration in time range)     Or   acetaminophen (TYLENOL) 160 MG/5ML solution 650 mg (has no administration in time range)     Or   acetaminophen (TYLENOL) suppository 650 mg (has no administration in time range)   sodium chloride 0.9 % infusion (100 mL/hr Intravenous New Bag 12/21/20 0616)   ondansetron (ZOFRAN) injection 4 mg (has no administration in time range)   morphine injection 2 mg (has no administration in time range)     And   naloxone (NARCAN) injection 0.4 mg (has no administration in time range)   sodium chloride 0.9 % bolus 500 mL (0 mL Intravenous Stopped 12/21/20 0321)   HYDROmorphone (DILAUDID) injection 0.5 mg (0.5 mg Intravenous Given 12/21/20 0238)   ondansetron (ZOFRAN) injection 4 mg (4 mg Intravenous Given 12/21/20 0238)    ondansetron (ZOFRAN) injection 4 mg (4 mg Intravenous Given 12/21/20 8835)     ECG/EMG Results (last 24 hours)     ** No results found for the last 24 hours. **        No orders to display               MDM    Final diagnoses:   Biliary colic   Non-intractable vomiting with nausea, unspecified vomiting type   Acute cholecystitis            Joseph Vail MD  12/21/20 9007       Joseph Vail MD  12/21/20 9341

## 2020-12-22 VITALS
OXYGEN SATURATION: 98 % | HEIGHT: 64 IN | HEART RATE: 76 BPM | SYSTOLIC BLOOD PRESSURE: 110 MMHG | DIASTOLIC BLOOD PRESSURE: 63 MMHG | RESPIRATION RATE: 16 BRPM | TEMPERATURE: 98.5 F | BODY MASS INDEX: 25.27 KG/M2 | WEIGHT: 148 LBS

## 2020-12-22 PROBLEM — N39.0 ACUTE UTI (URINARY TRACT INFECTION): Status: RESOLVED | Noted: 2020-12-21 | Resolved: 2020-12-22

## 2020-12-22 PROBLEM — K81.0 ACUTE CHOLECYSTITIS: Status: RESOLVED | Noted: 2020-12-21 | Resolved: 2020-12-22

## 2020-12-22 LAB
ALBUMIN SERPL-MCNC: 3.9 G/DL (ref 3.5–5.2)
ALBUMIN/GLOB SERPL: 1.8 G/DL
ALP SERPL-CCNC: 180 U/L (ref 39–117)
ALT SERPL W P-5'-P-CCNC: 431 U/L (ref 1–33)
ANION GAP SERPL CALCULATED.3IONS-SCNC: 11 MMOL/L (ref 5–15)
AST SERPL-CCNC: 281 U/L (ref 1–32)
BACTERIA SPEC AEROBE CULT: NORMAL
BASOPHILS # BLD AUTO: 0.02 10*3/MM3 (ref 0–0.2)
BASOPHILS NFR BLD AUTO: 0.3 % (ref 0–1.5)
BILIRUB SERPL-MCNC: 0.7 MG/DL (ref 0–1.2)
BUN SERPL-MCNC: 12 MG/DL (ref 6–20)
BUN/CREAT SERPL: 20 (ref 7–25)
CALCIUM SPEC-SCNC: 8.7 MG/DL (ref 8.6–10.5)
CHLORIDE SERPL-SCNC: 106 MMOL/L (ref 98–107)
CO2 SERPL-SCNC: 23 MMOL/L (ref 22–29)
CREAT SERPL-MCNC: 0.6 MG/DL (ref 0.57–1)
DEPRECATED RDW RBC AUTO: 43.3 FL (ref 37–54)
EOSINOPHIL # BLD AUTO: 0.03 10*3/MM3 (ref 0–0.4)
EOSINOPHIL NFR BLD AUTO: 0.5 % (ref 0.3–6.2)
ERYTHROCYTE [DISTWIDTH] IN BLOOD BY AUTOMATED COUNT: 13.5 % (ref 12.3–15.4)
GFR SERPL CREATININE-BSD FRML MDRD: 120 ML/MIN/1.73
GLOBULIN UR ELPH-MCNC: 2.2 GM/DL
GLUCOSE SERPL-MCNC: 75 MG/DL (ref 65–99)
HCT VFR BLD AUTO: 35.3 % (ref 34–46.6)
HGB BLD-MCNC: 11.1 G/DL (ref 12–15.9)
IMM GRANULOCYTES # BLD AUTO: 0.02 10*3/MM3 (ref 0–0.05)
IMM GRANULOCYTES NFR BLD AUTO: 0.3 % (ref 0–0.5)
LYMPHOCYTES # BLD AUTO: 1.29 10*3/MM3 (ref 0.7–3.1)
LYMPHOCYTES NFR BLD AUTO: 20.1 % (ref 19.6–45.3)
MCH RBC QN AUTO: 27.3 PG (ref 26.6–33)
MCHC RBC AUTO-ENTMCNC: 31.4 G/DL (ref 31.5–35.7)
MCV RBC AUTO: 86.9 FL (ref 79–97)
MONOCYTES # BLD AUTO: 0.54 10*3/MM3 (ref 0.1–0.9)
MONOCYTES NFR BLD AUTO: 8.4 % (ref 5–12)
NEUTROPHILS NFR BLD AUTO: 4.51 10*3/MM3 (ref 1.7–7)
NEUTROPHILS NFR BLD AUTO: 70.4 % (ref 42.7–76)
NRBC BLD AUTO-RTO: 0 /100 WBC (ref 0–0.2)
PLATELET # BLD AUTO: 246 10*3/MM3 (ref 140–450)
PMV BLD AUTO: 10.1 FL (ref 6–12)
POTASSIUM SERPL-SCNC: 4 MMOL/L (ref 3.5–5.2)
PROT SERPL-MCNC: 6.1 G/DL (ref 6–8.5)
RBC # BLD AUTO: 4.06 10*6/MM3 (ref 3.77–5.28)
SODIUM SERPL-SCNC: 140 MMOL/L (ref 136–145)
TSH SERPL DL<=0.05 MIU/L-ACNC: 4.07 UIU/ML (ref 0.27–4.2)
WBC # BLD AUTO: 6.41 10*3/MM3 (ref 3.4–10.8)

## 2020-12-22 PROCEDURE — 25010000003 POTASSIUM CHLORIDE PER 2 MEQ: Performed by: SURGERY

## 2020-12-22 PROCEDURE — 25010000002 ONDANSETRON PER 1 MG: Performed by: SURGERY

## 2020-12-22 PROCEDURE — 84443 ASSAY THYROID STIM HORMONE: CPT | Performed by: SURGERY

## 2020-12-22 PROCEDURE — 99217 PR OBSERVATION CARE DISCHARGE MANAGEMENT: CPT | Performed by: NURSE PRACTITIONER

## 2020-12-22 PROCEDURE — 99024 POSTOP FOLLOW-UP VISIT: CPT | Performed by: SURGERY

## 2020-12-22 PROCEDURE — 25010000002 CEFEPIME PER 500 MG: Performed by: SURGERY

## 2020-12-22 PROCEDURE — G0378 HOSPITAL OBSERVATION PER HR: HCPCS

## 2020-12-22 PROCEDURE — 25010000002 ENOXAPARIN PER 10 MG: Performed by: SURGERY

## 2020-12-22 PROCEDURE — 85025 COMPLETE CBC W/AUTO DIFF WBC: CPT | Performed by: SURGERY

## 2020-12-22 PROCEDURE — 80053 COMPREHEN METABOLIC PANEL: CPT | Performed by: SURGERY

## 2020-12-22 RX ORDER — ACETAMINOPHEN 500 MG
1000 TABLET ORAL EVERY 12 HOURS
Qty: 4 TABLET | Refills: 0 | Status: SHIPPED | OUTPATIENT
Start: 2020-12-22 | End: 2020-12-23

## 2020-12-22 RX ORDER — SIMETHICONE 80 MG
80 TABLET,CHEWABLE ORAL 4 TIMES DAILY PRN
Start: 2020-12-22 | End: 2021-01-06

## 2020-12-22 RX ADMIN — ONDANSETRON 4 MG: 2 INJECTION INTRAMUSCULAR; INTRAVENOUS at 05:39

## 2020-12-22 RX ADMIN — SODIUM CHLORIDE, PRESERVATIVE FREE 3 ML: 5 INJECTION INTRAVENOUS at 08:44

## 2020-12-22 RX ADMIN — METRONIDAZOLE 500 MG: 500 INJECTION, SOLUTION INTRAVENOUS at 08:44

## 2020-12-22 RX ADMIN — ACETAMINOPHEN 1000 MG: 500 TABLET ORAL at 05:39

## 2020-12-22 RX ADMIN — POTASSIUM CHLORIDE AND SODIUM CHLORIDE 100 ML/HR: 450; 150 INJECTION, SOLUTION INTRAVENOUS at 08:44

## 2020-12-22 RX ADMIN — ONDANSETRON 4 MG: 2 INJECTION INTRAMUSCULAR; INTRAVENOUS at 12:00

## 2020-12-22 RX ADMIN — ENOXAPARIN SODIUM 40 MG: 40 INJECTION SUBCUTANEOUS at 08:44

## 2020-12-22 RX ADMIN — ACETAMINOPHEN 650 MG: 325 TABLET, FILM COATED ORAL at 12:14

## 2020-12-22 RX ADMIN — CEFEPIME HYDROCHLORIDE 2 G: 2 INJECTION, POWDER, FOR SOLUTION INTRAVENOUS at 08:49

## 2020-12-22 NOTE — DISCHARGE SUMMARY
"    [facility] Medicine Services  DISCHARGE SUMMARY    Patient Name: Yadira Angela  : 1995  MRN: [id number]    Date of Admission: 2020  2:16 AM  Date of Discharge:  2020  Primary Care Physician: System, Provider Not In    Consults     Date and Time Order Name Status Description    2020 0714 Inpatient General Surgery Consult Completed           Hospital Course     Presenting Problem:   Biliary colic [K80.50]  Biliary colic [K80.50]  Biliary colic [K80.50]    Active Hospital Problems    Diagnosis  POA   • Biliary colic [K80.50]  Yes   • Anemia [D64.9]  Yes   • Mood disorder (CMS/HCC) [F39]  Yes   • Elevated LFTs [R79.89]  Yes   • Elevated bilirubin [R17]  Yes      Resolved Hospital Problems    Diagnosis Date Resolved POA   • **Acute cholecystitis [K81.0] 2020 Yes   • Acute UTI (urinary tract infection) [N39.0] 2020 Yes          Hospital Course:  Yadira Angela is a 25 y.o. female with history of anemia and depression presents with N/V and intermittent RUQ occurring with increasing frequency and intensity.     Acute cholecystitis  - MRCP shows GB showed stones, wall thickening and pericholecystic fluid, however CBD measured less than 4 mm.  Suspect elevated LFTs secondary to recently passed gallstone. Acute hepatitis panel negative.  - empiric antibiotics started (Penicillin allergy) -- patient currently breastfeeding, advised patient to \"pump and dump.\"  -Consulted General Surgery Dr Roland  -Patient underwent laparoscopic cholecystectomy on .    Elevated LFTs  --Felt secondary to recently passed gallstone.  Acute hepatitis panel negative.  --Postop day 1 patient's LFTs are trending down.  --Dr. Roland plans to follow-up with patient next week and repeat LFTs.    Currently breast-feeding  --Advised to pump and dump until noon on Thursday.  Discussed with pharmacist.  Possible risk with Flagyl infusions.  Should be cleared by Thursday at noon.  " "Patient agrees.     Today patient is seen sitting up in bed in no acute distress.   at bedside.  States her pain is well controlled currently at 2/10 scale.  Denies nausea or vomiting.  Tolerating diet.  Passing flatus.  She is to discharge home today.  I discussed with Dr. Roland and she is in agreement for discharge as planned.  Patient will need to follow-up with her in 1 week and she plans to repeat LFTs to further monitor for trending down. Patient is breast-feeding and advised her to follow-up abdomen as noted above until Thursday of this week.  PCP 1 week of discharge.      Discharge Follow Up Recommendations for outpatient labs/diagnostics:  Patient is cleared for discharge home today by all services.  Going home off antibiotics per surgery recommendations.    Patient declines any narcotic/pain medication at discharge stating she prefers to take Tylenol only.    She has been instructed to \"pump and dump\" her breast milk until noon on Thursday when she may resume breast-feeding.  This is to allow time for clearance of her IV antibiotics.    -Follow-up PCP 1 week of discharge   -Follow-up Dr. Roland 7-10 days And will need repeat CMP to further evaluate LFTs at that time    Day of Discharge     HPI:   Patient seen resting up in the bed in no acute distress.   at bedside.  Denies nausea, vomiting.  Tolerating diet.  Passing flatus.  Rates abdominal incisional pain 2/10 scale.  Asking for discharge home today.  No new issues.    Review of Systems  Gen- No fevers, chills  CV- No chest pain, palpitations  Resp- No cough, dyspnea  GI- No N/V/D, mild post op abd pain      Vital Signs:   Temp:  [98.2 °F (36.8 °C)-98.9 °F (37.2 °C)] 98.5 °F (36.9 °C)  Heart Rate:  [72-97] 76  Resp:  [15-18] 16  BP: (106-130)/(62-88) 110/63     Physical Exam:  Constitutional - no acute distress, nontoxic, resting in bed.  Awake and alert.  Significant other at bedside.  HEENT-NCAT, mucous membranes moist  CV-RRR, S1 " S2 normal, no m/r/g  Resp-CTAB bilaterally A/P, no wheezes, rhonchi or rales on RA.  Sats WNL.  Abd-soft, nondistended, normoactive bowel sounds.  Abdominal lap sites x4 intact with Dermabond and open to air.  Abdomen appropriately tender postop, no guarding or rebound.  Ext-No lower extremity cyanosis, clubbing or edema bilaterally.  Eric spontaneously.  Neuro-alert and oriented x3, speech clear and appropriate, moves all extremities.  Follows commands.  Psych-normal affect   Skin- No rash on exposed UE or LE bilaterally       Pertinent  and/or Most Recent Results     Results from last 7 days   Lab Units 12/22/20  0820 12/21/20 0231 12/21/20 0230   WBC 10*3/mm3 6.41  --  4.62   HEMOGLOBIN g/dL 11.1*  --  11.9*   HEMATOCRIT % 35.3  --  37.8   PLATELETS 10*3/mm3 246  --  255   SODIUM mmol/L 140 144  --    POTASSIUM mmol/L 4.0 3.7  --    CHLORIDE mmol/L 106 106  --    CO2 mmol/L 23.0 26.0  --    BUN mg/dL 12 17  --    CREATININE mg/dL 0.60 0.71  --    GLUCOSE mg/dL 75 121*  --    CALCIUM mg/dL 8.7 9.5  --      Results from last 7 days   Lab Units 12/22/20  0820 12/21/20 0231   BILIRUBIN mg/dL 0.7 1.3*   ALK PHOS U/L 180* 171*   ALT (SGPT) U/L 431* 445*   AST (SGOT) U/L 281* 900*           Invalid input(s): TG  Results from last 7 days   Lab Units 12/22/20 0820   TSH uIU/mL 4.070       Brief Urine Lab Results  (Last result in the past 365 days)      Color   Clarity   Blood   Leuk Est   Nitrite   Protein   CREAT   Urine HCG        12/21/20 0424               Negative           Microbiology Results Abnormal     Procedure Component Value - Date/Time    Urine Culture - Urine, Urine, Clean Catch [[id number]] Collected: 12/21/20 0422    Lab Status: Final result Specimen: Urine, Clean Catch Updated: 12/22/20 0847     Urine Culture >100,000 CFU/mL Mixed Dian Isolated    Narrative:      Specimen contains mixed organisms of questionable pathogenicity which indicates contamination with commensal dian.  Further identification  is unlikely to provide clinically useful information.  Suggest recollection.    COVID PRE-OP / PRE-PROCEDURE SCREENING ORDER (NO ISOLATION) - Swab, Nasopharynx [[id number]]  (Normal) Collected: 12/21/20 0500    Lab Status: Final result Specimen: Swab from Nasopharynx Updated: 12/21/20 0535    Narrative:      The following orders were created for panel order COVID PRE-OP / PRE-PROCEDURE SCREENING ORDER (NO ISOLATION) - Swab, Nasopharynx.  Procedure                               Abnormality         Status                     ---------                               -----------         ------                     COVID-19 and FLU A/B PCR...[[id number]]  Normal              Final result                 Please view results for these tests on the individual orders.    COVID-19 and FLU A/B PCR - Swab, Nasopharynx [[id number]]  (Normal) Collected: 12/21/20 0500    Lab Status: Final result Specimen: Swab from Nasopharynx Updated: 12/21/20 0535     COVID19 Not Detected     Influenza A PCR Not Detected     Influenza B PCR Not Detected    Narrative:      Fact sheet for providers: https://www.fda.gov/media/620067/download    Fact sheet for patients: https://www.fda.gov/media/143056/download    Test performed by PCR.          Imaging Results (All)     Procedure Component Value Units Date/Time    MRI abdomen wo contrast mrcp [[id number]] Collected: 12/21/20 0612     Updated: 12/21/20 0614    Narrative:      PROCEDURE: MRI MRCP WO    INDICATIONS: Acute RUQ abdominal pain, no prior imaging; Calculus of bile duct without cholangitis or cholecystitis without obstruction; Nausea with vomiting, unspecified; Calculus of bile duct without cholangitis or cholecystitis without obstruction    COMPARISON: No relevant comparison or correlation studies available at time of dictation.       TECHNIQUE:  Multiplanar MR imaging of the abdomen with dedicated MRCP 2-D and 3-D reconstructions. Study performed without IV contrast.contrast. Source and slab  images are reviewed of the biliary tree.     FINDINGS: Examination tailored for biliary evaluation and all solid viscera are not included on all sequences. Moderate pericholecystic fluid and/or gallbladder wall thickening present with numerous gallstones. However no intra or extrahepatic biliary  dilatation present. Distal common bile duct is less than 4 mm. No filling defects seen within the common bile duct. Grossly the kidneys are symmetric. No gross enlargement of the pancreas or peripancreatic fluid seen to indicate pancreatitis. Adrenal  glands and spleen are fairly homogeneous.      Impression:      Findings compatible with acute cholecystitis but no evidence of biliary obstruction or choledocholithiasis.                          Signer Name: Michaelle Skelton MD   Signed: 12/21/2020 6:12 AM   Workstation Name: [facility]    [facility]                         Plan for Follow-up of Pending Labs/Results:   Pending Labs     Order Current Status    Tissue Pathology Exam In process        Discharge Details        Discharge Medications      New Medications      Instructions Start Date   acetaminophen 500 MG tablet  Commonly known as: TYLENOL   1,000 mg, Oral, Every 12 Hours      simethicone 80 MG chewable tablet  Commonly known as: MYLICON   80 mg, Oral, 4 Times Daily PRN         Continue These Medications      Instructions Start Date   ferrous sulfate 325 (65 FE) MG tablet   325 mg, Oral, 2 Times Daily With Meals      FLUoxetine 10 MG capsule  Commonly known as: PROzac   10 mg, Oral, Daily      MULTI VIT/FL PO   Oral      Prenatal 27-1 27-1 MG tablet tablet   1 tablet, Oral, Daily         Stop These Medications    ibuprofen 600 MG tablet  Commonly known as: ADVIL,MOTRIN            Allergies   Allergen Reactions   • Amoxil [Amoxicillin] Rash         Discharge Disposition:  Home or Self Care    Diet:  Hospital:  Diet Order   Procedures   • Diet Regular       Activity:  Activity Instructions    "   Other Activity Instructions      Activity Instructions:     Discharge instructions per Dr. Roland recommendations:    No lifting 25 pounds or more x3 weeks  Okay to shower,  no bathing or swimming  Can drive when she feels safe to do so  Follow-up with her in 7-10 days    Additional Activity Instructions:     Advance activity as tolerated                   Restrictions or Other Recommendations:  Discharge instructions per Dr. Roland recommendations:    No lifting 25 pounds or more x3 weeks  Okay to shower,  no bathing or swimming  Can drive when she feels safe to do so  Follow-up with her in 7-10 days    Patient is to continue to \"pump and dump\" her breast milk for the next 48 hours to allow full clearance of her antibiotics.  She may resume normal breast-feeding on Thursday at noon.       CODE STATUS:    Code Status and Medical Interventions:   Ordered at: 12/21/20 0439     Code Status:    CPR     Medical Interventions (Level of Support Prior to Arrest):    Full       No future appointments.    Additional Instructions for the Follow-ups that You Need to Schedule     Discharge Follow-up with PCP   As directed       Currently Documented PCP:    System, Provider Not In    PCP Phone Number:    None     Follow Up Details: PCP 1 week of discharge (please schedule appointment prior to discharge today)         Discharge Follow-up with Specialty: Dr. Roland in 7-10 days (please schedule appointment for patient prior to her discharge home today… Dr. Roland asked that it be made to see her personally not the PA in this case)   As directed      Specialty: Dr. Roland in 7-10 days (please schedule appointment for patient prior to her discharge home today… Dr. Roland asked that it be made to see her personally not the PA in this case)    Follow Up Details: Patient will need a follow-up CMP at her appointment with Dr. Roland.               Estrellita Gongora, APRN  12/22/20      Time Spent on Discharge:  I " spent 45 minutes on this discharge activity which included: face-to-face encounter with the patient, reviewing the data in the system, coordination of the care with the nursing staff as well as consultants, documentation, and entering orders.

## 2020-12-22 NOTE — PROGRESS NOTES
"General Surgery     LOS: 1 day   Patient Care Team:  System, Provider Not In as PCP - General    Chief Complaint:  Acute cholecystitis    Subjective     Interval History:  Doing well.  No complaints.  Tolerating PO. Denies N/V.  No fevers.  Pain controlled.  Ambulating.  Voiding.      Objective     Vital Signs  Blood pressure 110/63, pulse 76, temperature 98.5 °F (36.9 °C), temperature source Oral, resp. rate 16, height 162.6 cm (64\"), weight 67.1 kg (148 lb), SpO2 98 %, currently breastfeeding.    Physical Exam:  General: Alert, NAD  Lungs: Clear  Heart: RRR  Abdomen: soft, appropriate, incisions ok  Extremities: warm, (+) SCDs     Results Review:     I reviewed the patient's new clinical results.    Labs:  Lab Results (last 24 hours)     Procedure Component Value Units Date/Time    Comprehensive Metabolic Panel [[id number]]  (Abnormal) Collected: 12/22/20 0820    Specimen: Blood Updated: 12/22/20 1021     Glucose 75 mg/dL      BUN 12 mg/dL      Creatinine 0.60 mg/dL      Sodium 140 mmol/L      Potassium 4.0 mmol/L      Comment: Slight hemolysis detected by analyzer. Results may be affected.        Chloride 106 mmol/L      CO2 23.0 mmol/L      Calcium 8.7 mg/dL      Total Protein 6.1 g/dL      Albumin 3.90 g/dL      ALT (SGPT) 431 U/L      AST (SGOT) 281 U/L      Alkaline Phosphatase 180 U/L      Total Bilirubin 0.7 mg/dL      eGFR Non African Amer 120 mL/min/1.73      Globulin 2.2 gm/dL      A/G Ratio 1.8 g/dL      BUN/Creatinine Ratio 20.0     Anion Gap 11.0 mmol/L     Narrative:      GFR Normal >60  Chronic Kidney Disease <60  Kidney Failure <15      TSH [[id number]]  (Normal) Collected: 12/22/20 0820    Specimen: Blood Updated: 12/22/20 1019     TSH 4.070 uIU/mL     CBC & Differential [[id number]]  (Abnormal) Collected: 12/22/20 0820    Specimen: Blood Updated: 12/22/20 0926    Narrative:      The following orders were created for panel order CBC & Differential.  Procedure                               Abnormality "         Status                     ---------                               -----------         ------                     CBC Auto Differential[[id number]]        Abnormal            Final result                 Please view results for these tests on the individual orders.    CBC Auto Differential [[id number]]  (Abnormal) Collected: 12/22/20 0820    Specimen: Blood Updated: 12/22/20 0926     WBC 6.41 10*3/mm3      RBC 4.06 10*6/mm3      Hemoglobin 11.1 g/dL      Hematocrit 35.3 %      MCV 86.9 fL      MCH 27.3 pg      MCHC 31.4 g/dL      RDW 13.5 %      RDW-SD 43.3 fl      MPV 10.1 fL      Platelets 246 10*3/mm3      Neutrophil % 70.4 %      Lymphocyte % 20.1 %      Monocyte % 8.4 %      Eosinophil % 0.5 %      Basophil % 0.3 %      Immature Grans % 0.3 %      Neutrophils, Absolute 4.51 10*3/mm3      Lymphocytes, Absolute 1.29 10*3/mm3      Monocytes, Absolute 0.54 10*3/mm3      Eosinophils, Absolute 0.03 10*3/mm3      Basophils, Absolute 0.02 10*3/mm3      Immature Grans, Absolute 0.02 10*3/mm3      nRBC 0.0 /100 WBC     Urine Culture - Urine, Urine, Clean Catch [[id number]] Collected: 12/21/20 0422    Specimen: Urine, Clean Catch Updated: 12/22/20 0847     Urine Culture >100,000 CFU/mL Mixed Dian Isolated    Narrative:      Specimen contains mixed organisms of questionable pathogenicity which indicates contamination with commensal dian.  Further identification is unlikely to provide clinically useful information.  Suggest recollection.    Tissue Pathology Exam [[id number]] Collected: 12/21/20 1533    Specimen: Tissue from Gallbladder Updated: 12/22/20 0702          Imaging:  Imaging Results (Last 24 Hours)     ** No results found for the last 24 hours. **            Assessment/Plan        POD#1 s/p lap angelo    Doing well.  Pre op symptoms resolved.  ALT/AST and alk phos not returning quickly to normal.  Suspect she passed a stone.  Ok for discharge.  F/u with me in office in 1-2 weeks with CMP.      No lifting x 3  weeks, no bathing x 3 weeks, ok to shower.  Diet as tolerated.          Eli Roland MD  12/22/20  14:17 EST

## 2020-12-23 LAB
CYTO UR: NORMAL
LAB AP CASE REPORT: NORMAL
LAB AP CLINICAL INFORMATION: NORMAL
PATH REPORT.FINAL DX SPEC: NORMAL
PATH REPORT.GROSS SPEC: NORMAL

## 2021-01-06 ENCOUNTER — OFFICE VISIT (OUTPATIENT)
Dept: BARIATRICS/WEIGHT MGMT | Facility: CLINIC | Age: 26
End: 2021-01-06

## 2021-01-06 VITALS
RESPIRATION RATE: 18 BRPM | SYSTOLIC BLOOD PRESSURE: 120 MMHG | HEART RATE: 89 BPM | BODY MASS INDEX: 23.99 KG/M2 | TEMPERATURE: 97.2 F | DIASTOLIC BLOOD PRESSURE: 68 MMHG | WEIGHT: 140.5 LBS | HEIGHT: 64 IN | OXYGEN SATURATION: 99 %

## 2021-01-06 DIAGNOSIS — K80.10 CHRONIC CALCULOUS CHOLECYSTITIS: Primary | ICD-10-CM

## 2021-01-06 PROCEDURE — 99024 POSTOP FOLLOW-UP VISIT: CPT | Performed by: SURGERY

## 2021-01-06 RX ORDER — NORETHINDRONE 0.35 MG/1
1 TABLET ORAL DAILY
COMMUNITY
Start: 2020-12-28

## 2021-01-06 NOTE — PROGRESS NOTES
[facility] Bariatric Surgery  [address]  [address]  [facility] [address] [phone]        Patient Name: Yadira Angela.  YOB: 1995      Date of Visit: 2021      Reason for Visit:  Post op    HPI:  Yadira Angela is a 25 y.o. female s/p 20 lap angelo.    No fevers/n/v.  Normal BMs after initially loose stool.  Symptoms are resolved.    Path reviewed with patient:  Final Diagnosis   GALLBLADDER, CHOLECYSTECTOMY:               Chronic cholecystitis with cholelithiasis and cholesterolosis         Past Medical History:   Diagnosis Date   • Anxiety    • Disease of thyroid gland    • PUPP (pruritic urticarial papules and plaques of pregnancy)      Past Surgical History:   Procedure Laterality Date   •  SECTION N/A 2018    Procedure:  SECTION PRIMARY;  Surgeon: Akiko Miller MD;  Location:  [facility] DELIVERY;  Service: Obstetrics   •  SECTION N/A 2020    Procedure:  SECTION REPEAT;  Surgeon: Akiko Miller MD;  Location: [facility] LABOR DELIVERY;  Service: Obstetrics;  Laterality: N/A;   • CHOLECYSTECTOMY N/A 2020    Procedure: CHOLECYSTECTOMY LAPAROSCOPIC;  Surgeon: Eli Roland MD;  Location: [facility] OR;  Service: General;  Laterality: N/A;   • WISDOM TOOTH EXTRACTION       Outpatient Medications Marked as Taking for the 21 encounter (Office Visit) with Eli Roland MD   Medication Sig Dispense Refill   • FLUoxetine (PROzac) 20 MG capsule Take 20 mg by mouth Daily.     • Prenatal Vit-Fe Fumarate-FA (PRENATAL ) 27-1 MG tablet tablet Take 1 tablet by mouth Daily.     • [DISCONTINUED] Pediatric Multivitamins-Fl (MULTI VIT/FL PO) Take  by mouth.       Allergies   Allergen Reactions   • Amoxil [Amoxicillin] Rash       Social History     Socioeconomic History   • Marital status:      Spouse name: Not on file   • Number of children: Not on file   • Years of education: Not on file   •  Highest education level: Not on file   Tobacco Use   • Smoking status: Never Smoker   • Smokeless tobacco: Never Used   Substance and Sexual Activity   • Alcohol use: No   • Drug use: No   • Sexual activity: Defer     Partners: Male       Vitals:    01/06/21 0913   BP: 120/68   Pulse: 89   Resp: 18   Temp: 97.2 °F (36.2 °C)   SpO2: 99%     Weight 63.7 kg (140 lb 8 oz)  Body mass index is 24.12 kg/m².    Physical Exam  Constitutional:       General: She is not in acute distress.     Appearance: She is well-developed. She is not diaphoretic.   HENT:      Head: Normocephalic and atraumatic.      Mouth/Throat:      Pharynx: No oropharyngeal exudate.   Eyes:      Conjunctiva/sclera: Conjunctivae normal.      Pupils: Pupils are equal, round, and reactive to light.   Pulmonary:      Effort: Pulmonary effort is normal. No respiratory distress.   Abdominal:      General: There is no distension.      Palpations: Abdomen is soft.      Comments: Incisions are well-healing without erythema, fluctuance, or drainage.  Some induration at the gallbladder extraction site, per patient is lessening.  Examined standing and lying with Valsalva--does not appear to be a hernia.     Skin:     General: Skin is warm and dry.      Coloration: Skin is not pale.   Neurological:      Mental Status: She is alert and oriented to person, place, and time.      Cranial Nerves: No cranial nerve deficit.   Psychiatric:         Behavior: Behavior normal.         Thought Content: Thought content normal.           Assessment:      ICD-10-CM ICD-9-CM   1. Chronic calculous cholecystitis  K80.10 574.10       Plan:    Doing well.  No lifting for 1 more week.  F/u PRN.

## 2023-11-17 ENCOUNTER — TRANSCRIBE ORDERS (OUTPATIENT)
Dept: ADMINISTRATIVE | Facility: HOSPITAL | Age: 28
End: 2023-11-17
Payer: COMMERCIAL

## 2023-11-17 DIAGNOSIS — R10.9 ABDOMINAL PAIN, UNSPECIFIED ABDOMINAL LOCATION: ICD-10-CM

## 2023-11-17 DIAGNOSIS — R10.32 LEFT LOWER QUADRANT PAIN: Primary | ICD-10-CM

## 2024-01-09 ENCOUNTER — HOSPITAL ENCOUNTER (OUTPATIENT)
Dept: CT IMAGING | Facility: HOSPITAL | Age: 29
Discharge: HOME | End: 2024-01-09
Admitting: OBSTETRICS & GYNECOLOGY
Payer: COMMERCIAL

## 2024-01-09 DIAGNOSIS — R10.32 LEFT LOWER QUADRANT PAIN: ICD-10-CM

## 2024-01-09 DIAGNOSIS — R10.9 ABDOMINAL PAIN, UNSPECIFIED ABDOMINAL LOCATION: ICD-10-CM

## 2024-01-09 PROCEDURE — 25510000001 IOPAMIDOL 61 % SOLUTION: Performed by: OBSTETRICS & GYNECOLOGY

## 2024-01-09 PROCEDURE — 74178 CT ABD&PLV WO CNTR FLWD CNTR: CPT

## 2024-01-09 RX ADMIN — IOPAMIDOL 85 ML: 612 INJECTION, SOLUTION INTRAVENOUS at 09:24

## 2024-03-13 ENCOUNTER — OFFICE VISIT (OUTPATIENT)
Dept: ENDOCRINOLOGY | Facility: CLINIC | Age: 29
End: 2024-03-13
Payer: COMMERCIAL

## 2024-03-13 VITALS
HEART RATE: 79 BPM | HEIGHT: 64 IN | SYSTOLIC BLOOD PRESSURE: 118 MMHG | BODY MASS INDEX: 28 KG/M2 | DIASTOLIC BLOOD PRESSURE: 72 MMHG | OXYGEN SATURATION: 96 % | WEIGHT: 164 LBS

## 2024-03-13 DIAGNOSIS — E03.8 HYPOTHYROIDISM DUE TO HASHIMOTO'S THYROIDITIS: Primary | ICD-10-CM

## 2024-03-13 DIAGNOSIS — E06.3 HYPOTHYROIDISM DUE TO HASHIMOTO'S THYROIDITIS: Primary | ICD-10-CM

## 2024-03-13 LAB — TSH SERPL DL<=0.05 MIU/L-ACNC: 3.72 UIU/ML (ref 0.27–4.2)

## 2024-03-13 PROCEDURE — 99213 OFFICE O/P EST LOW 20 MIN: CPT | Performed by: INTERNAL MEDICINE

## 2024-03-13 PROCEDURE — 84443 ASSAY THYROID STIM HORMONE: CPT | Performed by: INTERNAL MEDICINE

## 2024-03-13 RX ORDER — DESOGESTREL AND ETHINYL ESTRADIOL 0.15-0.03
1 KIT ORAL DAILY
COMMUNITY

## 2024-03-13 RX ORDER — DIPHENOXYLATE HYDROCHLORIDE AND ATROPINE SULFATE 2.5; .025 MG/1; MG/1
TABLET ORAL DAILY
COMMUNITY

## 2024-03-13 RX ORDER — LEVOTHYROXINE SODIUM 0.1 MG/1
1 TABLET ORAL DAILY
COMMUNITY

## 2024-04-15 NOTE — PROGRESS NOTES
Chief Complaint   Patient presents with    Hypothyroidism     New Consult        HPI:   Yadira Angela is a 28 y.o.female sent in consultation by REGULO Carvajal for further evaluation of her hypothyroidism. Her history is as follows:    1) hypothyroidism due to Hashimoto's thyroiditis:  -Was initially diagnosed with hypothyroidism at age 16 or 17.  She was prescribed low-dose levothyroxine at that time.  Later during pregnancy in 2018 and 2020 she reports not requiring levothyroxine  - In 2022, postpartum, the thyroxine was restarted    Current Dose: Levothyroxine 100 mcg  -Takes in AM usually on an empty stomach, but not always   -No interfering factors/supplements  -Is not on a high-dose biotin supplement  -Denies missed doses    On further review, patient reports fatigue despite getting adequate sleep.  She reports difficulty waking and having fatigue during the day    Recent outside labs: (08/28/2023) TSH 0.951 (0.450 - 4.500), free T 1.19 (0.82 - 1.77)     Review of Systems   Constitutional:  Positive for fatigue.   Eyes: Negative.    Respiratory: Negative.     Cardiovascular: Negative.    Gastrointestinal: Negative.    Endocrine: Negative.    Genitourinary: Negative.    Musculoskeletal: Negative.    Skin: Negative.    Allergic/Immunologic: Positive for environmental allergies.   Neurological:  Positive for headaches (Intermittent).   Hematological: Negative.    Psychiatric/Behavioral: Negative.  Negative for sleep disturbance.         History of anxiety       Past Medical History:   Diagnosis Date    Anxiety     Disease of thyroid gland     PUPP (pruritic urticarial papules and plaques of pregnancy)      family history includes Breast cancer in her paternal grandmother; COPD in her maternal grandmother and paternal grandfather; Hypertension in her maternal grandmother and mother; Throat cancer in her maternal grandfather; Thyroid disease in her paternal aunt and paternal grandmother.  Past  "Surgical History:   Procedure Laterality Date     SECTION N/A 2018    Procedure:  SECTION PRIMARY;  Surgeon: Akiko Miller MD;  Location: [facility] LABOR DELIVERY;  Service: Obstetrics     SECTION N/A 2020    Procedure:  SECTION REPEAT;  Surgeon: Akiko Miller MD;  Location:  [facility]IVERY;  Service: Obstetrics;  Laterality: N/A;    CHOLECYSTECTOMY N/A 2020    Procedure: CHOLECYSTECTOMY LAPAROSCOPIC;  Surgeon: Eli Roland MD;  Location: [facility] OR;  Service: General;  Laterality: N/A;    WISDOM TOOTH EXTRACTION       Social History     Tobacco Use    Smoking status: Never    Smokeless tobacco: Never   Vaping Use    Vaping status: Never Used   Substance Use Topics    Alcohol use: No    Drug use: No     Outpatient Medications Prior to Visit   Medication Sig Dispense Refill    Enskyce 0.15-30 MG-MCG per tablet Take 1 tablet by mouth Daily.      FLUoxetine (PROzac) 20 MG capsule Take 1 capsule by mouth Daily.      levothyroxine (SYNTHROID, LEVOTHROID) 100 MCG tablet Take 1 tablet by mouth Daily.      multivitamin (MULTI VITAMIN DAILY PO) Take  by mouth Daily.      Jencycla 0.35 MG tablet Take 1 tablet by mouth Daily.      Prenatal Vit-Fe Fumarate-FA (PRENATAL 27-) 27-1 MG tablet tablet Take 1 tablet by mouth Daily.       No facility-administered medications prior to visit.     Allergies   Allergen Reactions    Amoxil [Amoxicillin] Rash       /72   Pulse 79   Ht 162.6 cm (64\")   Wt 74.4 kg (164 lb)   SpO2 96%   Breastfeeding No   BMI 28.15 kg/m²   Physical Exam  Vitals reviewed.   Constitutional:       General: She is not in acute distress.     Appearance: She is well-developed. She is not diaphoretic.   HENT:      Head: Normocephalic.   Eyes:      Conjunctiva/sclera: Conjunctivae normal.      Pupils: Pupils are equal, round, and reactive to light.   Neck:      Thyroid: No thyromegaly.      Trachea: No tracheal deviation.      Comments: No " palpable thyroid nodules    Cardiovascular:      Rate and Rhythm: Normal rate and regular rhythm.      Heart sounds: Normal heart sounds. No murmur heard.  Pulmonary:      Effort: Pulmonary effort is normal. No respiratory distress.      Breath sounds: Normal breath sounds.   Abdominal:      General: Bowel sounds are normal.      Palpations: Abdomen is soft. There is no mass.      Tenderness: There is no abdominal tenderness.   Lymphadenopathy:      Cervical: No cervical adenopathy.   Skin:     General: Skin is warm and dry.      Findings: No erythema.   Neurological:      Mental Status: She is alert and oriented to person, place, and time.      Cranial Nerves: No cranial nerve deficit.   Psychiatric:         Behavior: Behavior normal.         LABS/IMAGING: outside records reviewed and summarized in HPI  Results for orders placed or performed in visit on 03/13/24   TSH    Specimen: Arm, Left; Blood   Result Value Ref Range    TSH 3.720 0.270 - 4.200 uIU/mL       ASSESSMENT/PLAN:    1) hypothyroidism due to Hashimoto's thyroiditis:   -Patient was clinically euthyroid on exam.  Patient reports increased fatigue despite adequate sleep.  - Reviewed treatment options and the various formulations of thyroid hormone available.  Reviewed the limitations and potential side effects of specific formulations including porcine thyroid hormone extracts and combination T4 + T3 therapy.  - TSH today WNL but at the upper end of the normal range.   - Plan is to have pt try Brand Synthroid 100 mcg nightly to see if she notes any difference between Brand vs generic. Samples given to patient. If she notes no difference, will discuss options of increasing to 112 mcg or possibly levothyroxine 100 mcg + liothyronine 5 mcg daily.   - Reviewed proper thyroid hormone administration, and factors to avoid that decrease medication potency and medication absorption.     RTC 5 months    Electronically Signed: Yolie Brar MD

## (undated) DEVICE — CVR HNDL LIGHT RIGID

## (undated) DEVICE — APPL COTN TP PLSTC 6IN STRL LF PK/2

## (undated) DEVICE — GLV SURG BIOGEL LTX PF 6

## (undated) DEVICE — SOL IRR NACL 0.9PCT BT 1000ML

## (undated) DEVICE — SUT PLAIN  3/0 CT1 27IN 842H

## (undated) DEVICE — SUT VIC 2/0 CT1 27IN J339H BX/36

## (undated) DEVICE — FLTR PLUMEPORT LAP W/CONN STRL

## (undated) DEVICE — PK C/SECT 10

## (undated) DEVICE — ENDOPOUCH RETRIEVER SPECIMEN RETRIEVAL BAGS: Brand: ENDOPOUCH RETRIEVER

## (undated) DEVICE — SKIN AFFIX SURG ADHESIVE 72/CS 0.55ML: Brand: MEDLINE

## (undated) DEVICE — ENDOPATH XCEL BLADELESS TROCARS WITH STABILITY SLEEVES: Brand: ENDOPATH XCEL

## (undated) DEVICE — SOL IRR H2O BTL 1000ML STRL

## (undated) DEVICE — TROCAR: Brand: KII FIOS FIRST ENTRY

## (undated) DEVICE — TRY SPINE BLCK WHITACRE 25G 3X5IN

## (undated) DEVICE — GLV SURG SENSICARE PI MIC PF SZ6.5 LF STRL

## (undated) DEVICE — ANTIBACTERIAL VIOLET BRAIDED (POLYGLACTIN 910), SYNTHETIC ABSORBABLE SUTURE: Brand: COATED VICRYL

## (undated) DEVICE — SUT MONOCRYL SZ 4 0 18IN PS1 Y682H BX/36

## (undated) DEVICE — SUT PDS 0 CT1 36IN Z346H

## (undated) DEVICE — MAT PREVALON MOBL TRANSFR AIR WO/PAD 39X80IN

## (undated) DEVICE — COATED VICRYL  (POLYGLACTIN 910) SUTURE, VIOLET BRAIDED, STERILE, SYNTHETIC ABSORBABLE SUTURE: Brand: COATED VICRYL

## (undated) DEVICE — SUT MNCRYL 1/0 CT1 36IN Y947H BX/36

## (undated) DEVICE — [HIGH FLOW INSUFFLATOR,  DO NOT USE IF PACKAGE IS DAMAGED,  KEEP DRY,  KEEP AWAY FROM SUNLIGHT,  PROTECT FROM HEAT AND RADIOACTIVE SOURCES.]: Brand: PNEUMOSURE

## (undated) DEVICE — VICRYL VLT 0 45CM ENDOLOOP: Brand: ENDOLOOP

## (undated) DEVICE — GLV SURG DERMASSURE GRN LF PF 7.0

## (undated) DEVICE — Device

## (undated) DEVICE — PK BARIATRIC 10

## (undated) DEVICE — ENDOPATH XCEL UNIVERSAL TROCAR STABLILITY SLEEVES: Brand: ENDOPATH XCEL